# Patient Record
Sex: FEMALE | Race: WHITE | Employment: OTHER
[De-identification: names, ages, dates, MRNs, and addresses within clinical notes are randomized per-mention and may not be internally consistent; named-entity substitution may affect disease eponyms.]

---

## 2017-01-11 ENCOUNTER — PRIOR ORIGINAL RECORDS (OUTPATIENT)
Dept: OTHER | Age: 77
End: 2017-01-11

## 2017-02-21 PROBLEM — J43.2 CENTRILOBULAR EMPHYSEMA (HCC): Status: ACTIVE | Noted: 2017-02-21

## 2017-03-07 ENCOUNTER — CARDPULM VISIT (OUTPATIENT)
Dept: CARDIAC REHAB | Facility: HOSPITAL | Age: 77
End: 2017-03-07
Attending: INTERNAL MEDICINE
Payer: MEDICARE

## 2017-03-07 VITALS
OXYGEN SATURATION: 95 % | DIASTOLIC BLOOD PRESSURE: 80 MMHG | HEIGHT: 59 IN | HEART RATE: 81 BPM | RESPIRATION RATE: 12 BRPM | BODY MASS INDEX: 28.76 KG/M2 | WEIGHT: 142.63 LBS | SYSTOLIC BLOOD PRESSURE: 126 MMHG

## 2017-03-07 DIAGNOSIS — J43.2 CENTRIACINAR EMPHYSEMA (HCC): Primary | ICD-10-CM

## 2017-03-20 ENCOUNTER — CARDPULM VISIT (OUTPATIENT)
Dept: CARDIAC REHAB | Facility: HOSPITAL | Age: 77
End: 2017-03-20
Attending: INTERNAL MEDICINE
Payer: MEDICARE

## 2017-03-22 ENCOUNTER — CARDPULM VISIT (OUTPATIENT)
Dept: CARDIAC REHAB | Facility: HOSPITAL | Age: 77
End: 2017-03-22
Attending: INTERNAL MEDICINE
Payer: MEDICARE

## 2017-03-24 ENCOUNTER — CARDPULM VISIT (OUTPATIENT)
Dept: CARDIAC REHAB | Facility: HOSPITAL | Age: 77
End: 2017-03-24
Attending: INTERNAL MEDICINE
Payer: MEDICARE

## 2017-03-27 ENCOUNTER — CARDPULM VISIT (OUTPATIENT)
Dept: CARDIAC REHAB | Facility: HOSPITAL | Age: 77
End: 2017-03-27
Attending: INTERNAL MEDICINE
Payer: MEDICARE

## 2017-03-29 ENCOUNTER — CARDPULM VISIT (OUTPATIENT)
Dept: CARDIAC REHAB | Facility: HOSPITAL | Age: 77
End: 2017-03-29
Attending: INTERNAL MEDICINE
Payer: MEDICARE

## 2017-04-03 ENCOUNTER — CARDPULM VISIT (OUTPATIENT)
Dept: CARDIAC REHAB | Facility: HOSPITAL | Age: 77
End: 2017-04-03
Attending: INTERNAL MEDICINE
Payer: MEDICARE

## 2017-04-05 ENCOUNTER — CARDPULM VISIT (OUTPATIENT)
Dept: CARDIAC REHAB | Facility: HOSPITAL | Age: 77
End: 2017-04-05
Attending: INTERNAL MEDICINE
Payer: MEDICARE

## 2017-04-07 ENCOUNTER — APPOINTMENT (OUTPATIENT)
Dept: CARDIAC REHAB | Facility: HOSPITAL | Age: 77
End: 2017-04-07
Attending: INTERNAL MEDICINE
Payer: MEDICARE

## 2017-04-12 ENCOUNTER — APPOINTMENT (OUTPATIENT)
Dept: CARDIAC REHAB | Facility: HOSPITAL | Age: 77
End: 2017-04-12
Attending: INTERNAL MEDICINE
Payer: MEDICARE

## 2017-04-14 ENCOUNTER — APPOINTMENT (OUTPATIENT)
Dept: CARDIAC REHAB | Facility: HOSPITAL | Age: 77
End: 2017-04-14
Attending: INTERNAL MEDICINE
Payer: MEDICARE

## 2017-04-17 ENCOUNTER — CARDPULM VISIT (OUTPATIENT)
Dept: CARDIAC REHAB | Facility: HOSPITAL | Age: 77
End: 2017-04-17
Attending: INTERNAL MEDICINE
Payer: MEDICARE

## 2017-04-19 PROBLEM — R00.2 PALPITATIONS: Status: ACTIVE | Noted: 2017-04-19

## 2017-04-20 ENCOUNTER — HOSPITAL ENCOUNTER (OUTPATIENT)
Dept: CV DIAGNOSTICS | Facility: HOSPITAL | Age: 77
Discharge: HOME | End: 2017-04-20
Attending: INTERNAL MEDICINE
Payer: MEDICARE

## 2017-04-20 DIAGNOSIS — R00.2 PALPITATIONS: ICD-10-CM

## 2017-04-20 DIAGNOSIS — K82.9 GALL BLADDER DISEASE: ICD-10-CM

## 2017-04-20 PROCEDURE — 93225 XTRNL ECG REC<48 HRS REC: CPT

## 2017-04-20 PROCEDURE — 93227 XTRNL ECG REC<48 HR R&I: CPT | Performed by: INTERNAL MEDICINE

## 2017-04-20 PROCEDURE — 93226 XTRNL ECG REC<48 HR SCAN A/R: CPT

## 2017-04-21 ENCOUNTER — CARDPULM VISIT (OUTPATIENT)
Dept: CARDIAC REHAB | Facility: HOSPITAL | Age: 77
End: 2017-04-21
Attending: INTERNAL MEDICINE
Payer: MEDICARE

## 2017-04-24 ENCOUNTER — APPOINTMENT (OUTPATIENT)
Dept: CARDIAC REHAB | Facility: HOSPITAL | Age: 77
End: 2017-04-24
Attending: INTERNAL MEDICINE
Payer: MEDICARE

## 2017-04-26 ENCOUNTER — CARDPULM VISIT (OUTPATIENT)
Dept: CARDIAC REHAB | Facility: HOSPITAL | Age: 77
End: 2017-04-26
Attending: INTERNAL MEDICINE
Payer: MEDICARE

## 2017-04-28 ENCOUNTER — CARDPULM VISIT (OUTPATIENT)
Dept: CARDIAC REHAB | Facility: HOSPITAL | Age: 77
End: 2017-04-28
Attending: INTERNAL MEDICINE
Payer: MEDICARE

## 2017-05-08 ENCOUNTER — CARDPULM VISIT (OUTPATIENT)
Dept: CARDIAC REHAB | Facility: HOSPITAL | Age: 77
End: 2017-05-08
Attending: INTERNAL MEDICINE
Payer: MEDICARE

## 2017-05-10 ENCOUNTER — CARDPULM VISIT (OUTPATIENT)
Dept: CARDIAC REHAB | Facility: HOSPITAL | Age: 77
End: 2017-05-10
Attending: INTERNAL MEDICINE
Payer: MEDICARE

## 2017-05-12 ENCOUNTER — CARDPULM VISIT (OUTPATIENT)
Dept: CARDIAC REHAB | Facility: HOSPITAL | Age: 77
End: 2017-05-12
Attending: INTERNAL MEDICINE
Payer: MEDICARE

## 2017-05-15 ENCOUNTER — APPOINTMENT (OUTPATIENT)
Dept: CARDIAC REHAB | Facility: HOSPITAL | Age: 77
End: 2017-05-15
Attending: INTERNAL MEDICINE
Payer: MEDICARE

## 2017-05-26 ENCOUNTER — APPOINTMENT (OUTPATIENT)
Dept: GENERAL RADIOLOGY | Facility: HOSPITAL | Age: 77
End: 2017-05-26
Attending: EMERGENCY MEDICINE
Payer: MEDICARE

## 2017-05-26 ENCOUNTER — HOSPITAL ENCOUNTER (EMERGENCY)
Facility: HOSPITAL | Age: 77
Discharge: HOME | End: 2017-05-26
Attending: EMERGENCY MEDICINE
Payer: MEDICARE

## 2017-05-26 VITALS
HEART RATE: 68 BPM | OXYGEN SATURATION: 97 % | DIASTOLIC BLOOD PRESSURE: 60 MMHG | SYSTOLIC BLOOD PRESSURE: 114 MMHG | RESPIRATION RATE: 15 BRPM | TEMPERATURE: 98 F

## 2017-05-26 DIAGNOSIS — S83.92XA SPRAIN OF LEFT KNEE, UNSPECIFIED LIGAMENT, INITIAL ENCOUNTER: Primary | ICD-10-CM

## 2017-05-26 PROCEDURE — 99283 EMERGENCY DEPT VISIT LOW MDM: CPT

## 2017-05-26 PROCEDURE — 73560 X-RAY EXAM OF KNEE 1 OR 2: CPT | Performed by: EMERGENCY MEDICINE

## 2017-05-27 NOTE — ED PROVIDER NOTES
Patient Seen in: [facility] Emergency Department    History   Patient presents with:  Lower Extremity Injury (musculoskeletal)    Stated Complaint: L KNEE PAIN    HPI    68-year-old female who presents her to the emergency department complaining of Take 1 tablet by mouth. Acetaminophen (TYLENOL OR),     Fluticasone Propionate (FLONASE) 50 MCG/ACT Nasal Suspension,  by Each Nare route daily. Multiple Vitamin (MULTI-VITAMIN) Oral Tab,  Take 1 tablet by mouth daily.    SB LOW DOSE ASA EC 81 MG OR TBE obvious deformity. No ligamentous laxity on examination knee but there is pain along the medial collateral ligament. No obvious effusions. No calf tenderness.        ED Course   Labs Reviewed - No data to display        XR KNEE (1 OR 2 VIEWS), LEFT (CPT=

## 2017-05-27 NOTE — ED INITIAL ASSESSMENT (HPI)
Pt to ED from home with c/o left knee pain since she twisted her knee yesterday while walking dog. Pt ambulatory, c/o pain with bending.

## 2017-05-29 ENCOUNTER — HOSPITAL ENCOUNTER (EMERGENCY)
Facility: HOSPITAL | Age: 77
Discharge: HOME | End: 2017-05-29
Attending: EMERGENCY MEDICINE
Payer: MEDICARE

## 2017-05-29 ENCOUNTER — APPOINTMENT (OUTPATIENT)
Dept: GENERAL RADIOLOGY | Facility: HOSPITAL | Age: 77
End: 2017-05-29
Attending: EMERGENCY MEDICINE
Payer: MEDICARE

## 2017-05-29 VITALS
HEART RATE: 74 BPM | TEMPERATURE: 98 F | SYSTOLIC BLOOD PRESSURE: 128 MMHG | BODY MASS INDEX: 28.22 KG/M2 | HEIGHT: 59 IN | RESPIRATION RATE: 16 BRPM | DIASTOLIC BLOOD PRESSURE: 66 MMHG | WEIGHT: 140 LBS | OXYGEN SATURATION: 98 %

## 2017-05-29 DIAGNOSIS — S61.511A: ICD-10-CM

## 2017-05-29 DIAGNOSIS — S63.501A WRIST SPRAIN, RIGHT, INITIAL ENCOUNTER: Primary | ICD-10-CM

## 2017-05-29 PROCEDURE — 73110 X-RAY EXAM OF WRIST: CPT | Performed by: EMERGENCY MEDICINE

## 2017-05-29 PROCEDURE — 99283 EMERGENCY DEPT VISIT LOW MDM: CPT

## 2017-05-29 PROCEDURE — 90471 IMMUNIZATION ADMIN: CPT

## 2017-05-29 PROCEDURE — 12001 RPR S/N/AX/GEN/TRNK 2.5CM/<: CPT

## 2017-05-29 RX ORDER — TETANUS AND DIPHTHERIA TOXOIDS ADSORBED 2; 2 [LF]/.5ML; [LF]/.5ML
0.5 INJECTION INTRAMUSCULAR ONCE
Status: COMPLETED | OUTPATIENT
Start: 2017-05-29 | End: 2017-05-29

## 2017-05-30 NOTE — ED PROVIDER NOTES
Patient Seen in: [facility] Emergency Department    History   Patient presents with:  Laceration Abrasion (integumentary)  Upper Extremity Injury (musculoskeletal)    Stated Complaint: abrasion to right forearm    HPI    68-year-old female presented hydrocortisone 2.5 % External Cream,  Use on AA BID prn   Econazole Nitrate 1 % External Cream,  Use on AA BID prn   Probiotic Product (ALIGN OR),  Take 1 tablet by mouth.    Acetaminophen (TYLENOL OR),     Fluticasone Propionate (FLONASE) 50 MCG/ACT Nasal Current:/66 mmHg  Pulse 75  Temp(Src) 97.8 °F (36.6 °C) (Temporal)  Resp 16  Ht 149.9 cm (4' 11\")  Wt 63.504 kg  BMI 28.26 kg/m2  SpO2 95%        Physical Exam   Constitutional: She is oriented to person, place, and time.  She appears well-developed Medications Prescribed:  Current Discharge Medication List

## 2017-05-30 NOTE — ED NOTES
RE-EVAL PER MD AND OK FOR DC HOME WITH FU INST TO PMD IN 1-2 DAYS AS DIRECTED. REVIEW OF RADIOGRAPHICS. QUESTIONS ANSWERED PER CECY GUTIERREZ.  IN AGREEMENT WITH POC.   AMB + GAIT WITH GRANDDAUGHTER

## 2017-05-31 ENCOUNTER — APPOINTMENT (OUTPATIENT)
Dept: CARDIAC REHAB | Facility: HOSPITAL | Age: 77
End: 2017-05-31
Attending: INTERNAL MEDICINE
Payer: MEDICARE

## 2017-06-02 ENCOUNTER — APPOINTMENT (OUTPATIENT)
Dept: CARDIAC REHAB | Facility: HOSPITAL | Age: 77
End: 2017-06-02
Attending: INTERNAL MEDICINE
Payer: MEDICARE

## 2017-06-02 PROBLEM — S60.811A: Status: ACTIVE | Noted: 2017-06-02

## 2017-06-16 PROBLEM — S63.501D: Status: ACTIVE | Noted: 2017-06-16

## 2017-06-16 PROBLEM — S60.811D: Status: ACTIVE | Noted: 2017-06-16

## 2017-08-21 PROBLEM — Z87.891 HISTORY OF TOBACCO ABUSE: Status: ACTIVE | Noted: 2017-08-21

## 2017-10-26 PROBLEM — S60.811A: Status: RESOLVED | Noted: 2017-06-02 | Resolved: 2017-10-26

## 2017-10-26 PROBLEM — H26.9 CATARACT OF BOTH EYES, UNSPECIFIED CATARACT TYPE: Status: ACTIVE | Noted: 2017-10-26

## 2017-10-26 PROBLEM — M47.816 LUMBAR ARTHROPATHY: Status: ACTIVE | Noted: 2017-10-26

## 2017-10-26 PROBLEM — R00.2 PALPITATIONS: Status: RESOLVED | Noted: 2017-04-19 | Resolved: 2017-10-26

## 2017-10-26 PROBLEM — S63.501D: Status: RESOLVED | Noted: 2017-06-16 | Resolved: 2017-10-26

## 2017-10-26 PROBLEM — S60.811D: Status: RESOLVED | Noted: 2017-06-16 | Resolved: 2017-10-26

## 2017-11-20 ENCOUNTER — HOSPITAL (OUTPATIENT)
Dept: OTHER | Age: 77
End: 2017-11-20
Attending: INTERNAL MEDICINE

## 2018-02-21 ENCOUNTER — PRIOR ORIGINAL RECORDS (OUTPATIENT)
Dept: OTHER | Age: 78
End: 2018-02-21

## 2018-04-18 ENCOUNTER — OFFICE VISIT (OUTPATIENT)
Dept: HEMATOLOGY/ONCOLOGY | Facility: HOSPITAL | Age: 78
End: 2018-04-18
Attending: OBSTETRICS & GYNECOLOGY
Payer: MEDICARE

## 2018-04-18 VITALS
SYSTOLIC BLOOD PRESSURE: 149 MMHG | TEMPERATURE: 98 F | DIASTOLIC BLOOD PRESSURE: 71 MMHG | OXYGEN SATURATION: 100 % | HEIGHT: 60 IN | WEIGHT: 141 LBS | RESPIRATION RATE: 18 BRPM | HEART RATE: 95 BPM | BODY MASS INDEX: 27.68 KG/M2

## 2018-04-18 PROCEDURE — 99211 OFF/OP EST MAY X REQ PHY/QHP: CPT

## 2018-04-18 NOTE — PROGRESS NOTES
Patient is here for MD consult. Hx of anal cancer. Pt had chemo/XRT in 2007 for tx of anal cancer. Saw Dermatologist recently and was found to have a lesion on vulvar. Here to discuss.        Education Record    Learner:  Patient    Disease / Diagnosis: con

## 2018-04-19 NOTE — CONSULTS
INITIAL GYNECOLOGY ONCOLOGY EVALUATION      Patient: Camryn Mohr  MR#: [id number]  Date: 4/19/2018    Referring Physician:  Melissa Anchors:  [id number]    HISTORY OF PRESENT ILLNESS:    Camryn Mohr is a 78year old female who presents today Inhalation Aero Soln Inhale 2 puffs into the lungs every 6 (six) hours as needed for Wheezing. Disp: 1 Inhaler Rfl: 3   Probiotic Product (ALIGN OR) Take 1 tablet by mouth.  Disp:  Rfl:    Acetaminophen (TYLENOL OR)  Disp:  Rfl:    Multiple Vitamin (MULTI-V Clear  HEART: Normal rhythm, no extra sounds  ABDOMEN: No masses or tendeness  EXTREMITIES: No edema  NEUROLOGIC: Negative  Pelvic: no visible lesions on the vulva especially at the 5:00 position    DATABASE:  Path reviewed     ASSESSMENT:  Tere Bullard i

## 2018-05-09 PROBLEM — D07.1 VIN III (VULVAR INTRAEPITHELIAL NEOPLASIA III): Status: ACTIVE | Noted: 2018-05-09

## 2018-05-09 PROBLEM — M47.816 LUMBAR ARTHROPATHY: Status: RESOLVED | Noted: 2017-10-26 | Resolved: 2018-05-09

## 2018-05-09 PROBLEM — J43.2 CENTRILOBULAR EMPHYSEMA (HCC): Chronic | Status: ACTIVE | Noted: 2017-02-21

## 2018-05-09 PROBLEM — D07.1 VIN III (VULVAR INTRAEPITHELIAL NEOPLASIA III): Chronic | Status: ACTIVE | Noted: 2018-05-09

## 2018-08-07 ENCOUNTER — HOSPITAL (OUTPATIENT)
Dept: OTHER | Age: 78
End: 2018-08-07
Attending: ORTHOPAEDIC SURGERY

## 2018-08-15 ENCOUNTER — APPOINTMENT (OUTPATIENT)
Dept: HEMATOLOGY/ONCOLOGY | Facility: HOSPITAL | Age: 78
End: 2018-08-15
Attending: OBSTETRICS & GYNECOLOGY
Payer: MEDICARE

## 2018-08-20 ENCOUNTER — LAB ENCOUNTER (OUTPATIENT)
Dept: LAB | Facility: HOSPITAL | Age: 78
End: 2018-08-20
Attending: INTERNAL MEDICINE
Payer: MEDICARE

## 2018-08-20 DIAGNOSIS — R00.2 PALPITATIONS: ICD-10-CM

## 2018-08-20 DIAGNOSIS — Z01.818 PREOPERATIVE EVALUATION TO RULE OUT SURGICAL CONTRAINDICATION: ICD-10-CM

## 2018-08-20 PROBLEM — M76.821 POSTERIOR TIBIAL TENDON DYSFUNCTION (PTTD) OF RIGHT LOWER EXTREMITY: Status: ACTIVE | Noted: 2018-08-20

## 2018-08-20 LAB
CHOLEST SERPL-MCNC: 260 MG/DL (ref 110–200)
HDLC SERPL-MCNC: 69 MG/DL
LDLC SERPL CALC-MCNC: 150 MG/DL (ref 0–99)
NONHDLC SERPL-MCNC: 191 MG/DL
TRIGL SERPL-MCNC: 205 MG/DL (ref 1–149)

## 2018-08-20 PROCEDURE — 36415 COLL VENOUS BLD VENIPUNCTURE: CPT

## 2018-08-20 PROCEDURE — 80061 LIPID PANEL: CPT

## 2018-08-29 ENCOUNTER — OFFICE VISIT (OUTPATIENT)
Dept: HEMATOLOGY/ONCOLOGY | Facility: HOSPITAL | Age: 78
End: 2018-08-29
Attending: OBSTETRICS & GYNECOLOGY
Payer: MEDICARE

## 2018-08-29 VITALS
RESPIRATION RATE: 18 BRPM | DIASTOLIC BLOOD PRESSURE: 81 MMHG | HEIGHT: 60 IN | TEMPERATURE: 97 F | HEART RATE: 96 BPM | BODY MASS INDEX: 27.64 KG/M2 | SYSTOLIC BLOOD PRESSURE: 130 MMHG | WEIGHT: 140.81 LBS

## 2018-08-29 PROCEDURE — 99211 OFF/OP EST MAY X REQ PHY/QHP: CPT

## 2018-08-29 NOTE — PROGRESS NOTES
Yasmine Irizarry is a 70year old female who presents today with anal cell carcinoma 2007. As part of a derm screen left vulva pigmented area VIN3 with involved margin,  bx of right lesion is a condyloma. Here for consultation 4/18.   Lesion at 5:00 totally r

## 2018-08-29 NOTE — PROGRESS NOTES
Patient is here for Dr Mata Breaux f/u for anal cancer. Patient denies any complaints. No pain.        Education Record    Learner:  Patient    Disease / Diagnosis: anal cancer     Barriers / Limitations:  None   Comments:    Method:  Discussion   Comments:    GRACE

## 2018-09-11 ENCOUNTER — ANESTHESIA (OUTPATIENT)
Dept: SURGERY | Facility: HOSPITAL | Age: 78
End: 2018-09-11
Payer: MEDICARE

## 2018-09-11 ENCOUNTER — APPOINTMENT (OUTPATIENT)
Dept: GENERAL RADIOLOGY | Facility: HOSPITAL | Age: 78
End: 2018-09-11
Attending: ORTHOPAEDIC SURGERY
Payer: MEDICARE

## 2018-09-11 ENCOUNTER — HOSPITAL ENCOUNTER (OUTPATIENT)
Facility: HOSPITAL | Age: 78
Setting detail: OBSERVATION
Discharge: HOME HEALTH | End: 2018-09-12
Attending: ORTHOPAEDIC SURGERY | Admitting: ORTHOPAEDIC SURGERY
Payer: MEDICARE

## 2018-09-11 ENCOUNTER — ANESTHESIA EVENT (OUTPATIENT)
Dept: SURGERY | Facility: HOSPITAL | Age: 78
End: 2018-09-11
Payer: MEDICARE

## 2018-09-11 DIAGNOSIS — M76.821 POSTERIOR TIBIAL TENDON DYSFUNCTION (PTTD) OF RIGHT LOWER EXTREMITY: ICD-10-CM

## 2018-09-11 DIAGNOSIS — M21.6X1: ICD-10-CM

## 2018-09-11 LAB
ADENOVIRUS F 40/41 PCR: NEGATIVE
ASTROVIRUS PCR: NEGATIVE
C CAYETANENSIS DNA SPEC QL NAA+PROBE: NEGATIVE
C. DIFFICILE TOXIN A/B PCR: NEGATIVE
CAMPY SP DNA.DIARRHEA STL QL NAA+PROBE: NEGATIVE
CRYPTOSP DNA SPEC QL NAA+PROBE: NEGATIVE
EAEC PAA PLAS AGGR+AATA ST NAA+NON-PRB: NEGATIVE
EC STX1+STX2 + H7 FLIC SPEC NAA+PROBE: NEGATIVE
ENTAMOEBA HISTOLYTICA PCR: NEGATIVE
EPEC EAE GENE STL QL NAA+NON-PROBE: NEGATIVE
ETEC LTA+ST1A+ST1B TOX ST NAA+NON-PROBE: NEGATIVE
GIARDIA LAMBLIA PCR: NEGATIVE
NOROVIRUS GI/GII PCR: NEGATIVE
P SHIGELLOIDES DNA STL QL NAA+PROBE: NEGATIVE
ROTAVIRUS A PCR: NEGATIVE
SALMONELLA DNA SPEC QL NAA+PROBE: NEGATIVE
SAPOVIRUS PCR: NEGATIVE
SHIGELLA SP+EIEC IPAH ST NAA+NON-PROBE: NEGATIVE
V CHOLERAE DNA SPEC QL NAA+PROBE: NEGATIVE
VIBRIO DNA SPEC NAA+PROBE: NEGATIVE
YERSINIA DNA SPEC NAA+PROBE: NEGATIVE

## 2018-09-11 PROCEDURE — 73650 X-RAY EXAM OF HEEL: CPT | Performed by: ORTHOPAEDIC SURGERY

## 2018-09-11 PROCEDURE — 3E0T3BZ INTRODUCTION OF ANESTHETIC AGENT INTO PERIPHERAL NERVES AND PLEXI, PERCUTANEOUS APPROACH: ICD-10-PCS | Performed by: ANESTHESIOLOGY

## 2018-09-11 PROCEDURE — 64445 NJX AA&/STRD SCIATIC NRV IMG: CPT | Performed by: ORTHOPAEDIC SURGERY

## 2018-09-11 PROCEDURE — 87507 IADNA-DNA/RNA PROBE TQ 12-25: CPT | Performed by: ORTHOPAEDIC SURGERY

## 2018-09-11 PROCEDURE — 76942 ECHO GUIDE FOR BIOPSY: CPT | Performed by: ORTHOPAEDIC SURGERY

## 2018-09-11 PROCEDURE — 0MQQ0ZZ REPAIR RIGHT ANKLE BURSA AND LIGAMENT, OPEN APPROACH: ICD-10-PCS | Performed by: ORTHOPAEDIC SURGERY

## 2018-09-11 PROCEDURE — 0LSN0ZZ REPOSITION RIGHT LOWER LEG TENDON, OPEN APPROACH: ICD-10-PCS | Performed by: ORTHOPAEDIC SURGERY

## 2018-09-11 PROCEDURE — 64447 NJX AA&/STRD FEMORAL NRV IMG: CPT | Performed by: ORTHOPAEDIC SURGERY

## 2018-09-11 PROCEDURE — 0LXN0ZZ TRANSFER RIGHT LOWER LEG TENDON, OPEN APPROACH: ICD-10-PCS | Performed by: ORTHOPAEDIC SURGERY

## 2018-09-11 PROCEDURE — 76000 FLUOROSCOPY <1 HR PHYS/QHP: CPT | Performed by: ORTHOPAEDIC SURGERY

## 2018-09-11 PROCEDURE — 0QSL04Z REPOSITION RIGHT TARSAL WITH INTERNAL FIXATION DEVICE, OPEN APPROACH: ICD-10-PCS | Performed by: ORTHOPAEDIC SURGERY

## 2018-09-11 PROCEDURE — 99152 MOD SED SAME PHYS/QHP 5/>YRS: CPT | Performed by: ORTHOPAEDIC SURGERY

## 2018-09-11 DEVICE — SCREW BN 4MM 6MM 46MM LCP SS: Type: IMPLANTABLE DEVICE | Status: FUNCTIONAL

## 2018-09-11 DEVICE — ANCHOR SUT 4.75MM: Type: IMPLANTABLE DEVICE | Status: FUNCTIONAL

## 2018-09-11 DEVICE — ANCHOR SWIVELOCK AR-2324BCC: Type: IMPLANTABLE DEVICE | Status: FUNCTIONAL

## 2018-09-11 RX ORDER — SODIUM CHLORIDE, SODIUM LACTATE, POTASSIUM CHLORIDE, CALCIUM CHLORIDE 600; 310; 30; 20 MG/100ML; MG/100ML; MG/100ML; MG/100ML
INJECTION, SOLUTION INTRAVENOUS CONTINUOUS
Status: DISCONTINUED | OUTPATIENT
Start: 2018-09-11 | End: 2018-09-12

## 2018-09-11 RX ORDER — LIDOCAINE HYDROCHLORIDE 10 MG/ML
INJECTION, SOLUTION INFILTRATION; PERINEURAL
Status: DISCONTINUED | OUTPATIENT
Start: 2018-09-11 | End: 2018-09-11

## 2018-09-11 RX ORDER — ONDANSETRON 2 MG/ML
INJECTION INTRAMUSCULAR; INTRAVENOUS AS NEEDED
Status: DISCONTINUED | OUTPATIENT
Start: 2018-09-11 | End: 2018-09-11 | Stop reason: SURG

## 2018-09-11 RX ORDER — ROPIVACAINE HYDROCHLORIDE 5 MG/ML
INJECTION, SOLUTION EPIDURAL; INFILTRATION; PERINEURAL
Status: COMPLETED | OUTPATIENT
Start: 2018-09-11 | End: 2018-09-11

## 2018-09-11 RX ORDER — DEXAMETHASONE SODIUM PHOSPHATE 10 MG/ML
INJECTION, SOLUTION INTRAMUSCULAR; INTRAVENOUS
Status: DISCONTINUED | OUTPATIENT
Start: 2018-09-11 | End: 2018-09-11

## 2018-09-11 RX ORDER — METOCLOPRAMIDE 10 MG/1
10 TABLET ORAL ONCE
Status: DISCONTINUED | OUTPATIENT
Start: 2018-09-11 | End: 2018-09-11 | Stop reason: HOSPADM

## 2018-09-11 RX ORDER — BISACODYL 10 MG
10 SUPPOSITORY, RECTAL RECTAL
Status: DISCONTINUED | OUTPATIENT
Start: 2018-09-11 | End: 2018-09-12

## 2018-09-11 RX ORDER — NALOXONE HYDROCHLORIDE 0.4 MG/ML
80 INJECTION, SOLUTION INTRAMUSCULAR; INTRAVENOUS; SUBCUTANEOUS AS NEEDED
Status: DISCONTINUED | OUTPATIENT
Start: 2018-09-11 | End: 2018-09-11 | Stop reason: HOSPADM

## 2018-09-11 RX ORDER — LIDOCAINE HYDROCHLORIDE 10 MG/ML
INJECTION, SOLUTION EPIDURAL; INFILTRATION; INTRACAUDAL; PERINEURAL AS NEEDED
Status: DISCONTINUED | OUTPATIENT
Start: 2018-09-11 | End: 2018-09-11 | Stop reason: SURG

## 2018-09-11 RX ORDER — SODIUM CHLORIDE 0.9 % (FLUSH) 0.9 %
10 SYRINGE (ML) INJECTION AS NEEDED
Status: DISCONTINUED | OUTPATIENT
Start: 2018-09-11 | End: 2018-09-12

## 2018-09-11 RX ORDER — METOCLOPRAMIDE HYDROCHLORIDE 5 MG/ML
10 INJECTION INTRAMUSCULAR; INTRAVENOUS EVERY 6 HOURS PRN
Status: DISCONTINUED | OUTPATIENT
Start: 2018-09-11 | End: 2018-09-12

## 2018-09-11 RX ORDER — ONDANSETRON 2 MG/ML
4 INJECTION INTRAMUSCULAR; INTRAVENOUS EVERY 6 HOURS PRN
Status: DISCONTINUED | OUTPATIENT
Start: 2018-09-11 | End: 2018-09-12

## 2018-09-11 RX ORDER — ENOXAPARIN SODIUM 100 MG/ML
40 INJECTION SUBCUTANEOUS DAILY
Status: DISCONTINUED | OUTPATIENT
Start: 2018-09-12 | End: 2018-09-12

## 2018-09-11 RX ORDER — ALBUTEROL SULFATE 90 UG/1
2 AEROSOL, METERED RESPIRATORY (INHALATION) EVERY 6 HOURS PRN
Status: DISCONTINUED | OUTPATIENT
Start: 2018-09-11 | End: 2018-09-12

## 2018-09-11 RX ORDER — ONDANSETRON 2 MG/ML
4 INJECTION INTRAMUSCULAR; INTRAVENOUS ONCE AS NEEDED
Status: DISCONTINUED | OUTPATIENT
Start: 2018-09-11 | End: 2018-09-11 | Stop reason: HOSPADM

## 2018-09-11 RX ORDER — MORPHINE SULFATE 10 MG/ML
6 INJECTION, SOLUTION INTRAMUSCULAR; INTRAVENOUS EVERY 10 MIN PRN
Status: DISCONTINUED | OUTPATIENT
Start: 2018-09-11 | End: 2018-09-11 | Stop reason: HOSPADM

## 2018-09-11 RX ORDER — MORPHINE SULFATE 2 MG/ML
2 INJECTION, SOLUTION INTRAMUSCULAR; INTRAVENOUS EVERY 2 HOUR PRN
Status: DISCONTINUED | OUTPATIENT
Start: 2018-09-11 | End: 2018-09-12

## 2018-09-11 RX ORDER — MORPHINE SULFATE 4 MG/ML
4 INJECTION, SOLUTION INTRAMUSCULAR; INTRAVENOUS EVERY 2 HOUR PRN
Status: DISCONTINUED | OUTPATIENT
Start: 2018-09-11 | End: 2018-09-12

## 2018-09-11 RX ORDER — CEFAZOLIN SODIUM/WATER 2 G/20 ML
SYRINGE (ML) INTRAVENOUS AS NEEDED
Status: DISCONTINUED | OUTPATIENT
Start: 2018-09-11 | End: 2018-09-11 | Stop reason: SURG

## 2018-09-11 RX ORDER — DEXAMETHASONE SODIUM PHOSPHATE 4 MG/ML
VIAL (ML) INJECTION AS NEEDED
Status: DISCONTINUED | OUTPATIENT
Start: 2018-09-11 | End: 2018-09-11 | Stop reason: SURG

## 2018-09-11 RX ORDER — POLYETHYLENE GLYCOL 3350 17 G/17G
17 POWDER, FOR SOLUTION ORAL DAILY PRN
Status: DISCONTINUED | OUTPATIENT
Start: 2018-09-11 | End: 2018-09-12

## 2018-09-11 RX ORDER — FAMOTIDINE 20 MG/1
20 TABLET ORAL ONCE
Status: DISCONTINUED | OUTPATIENT
Start: 2018-09-11 | End: 2018-09-11 | Stop reason: HOSPADM

## 2018-09-11 RX ORDER — MORPHINE SULFATE 4 MG/ML
2 INJECTION, SOLUTION INTRAMUSCULAR; INTRAVENOUS EVERY 10 MIN PRN
Status: DISCONTINUED | OUTPATIENT
Start: 2018-09-11 | End: 2018-09-11 | Stop reason: HOSPADM

## 2018-09-11 RX ORDER — SODIUM PHOSPHATE, DIBASIC AND SODIUM PHOSPHATE, MONOBASIC 7; 19 G/133ML; G/133ML
1 ENEMA RECTAL ONCE AS NEEDED
Status: DISCONTINUED | OUTPATIENT
Start: 2018-09-11 | End: 2018-09-12

## 2018-09-11 RX ORDER — HYDROCODONE BITARTRATE AND ACETAMINOPHEN 10; 325 MG/1; MG/1
1 TABLET ORAL EVERY 4 HOURS PRN
Status: DISCONTINUED | OUTPATIENT
Start: 2018-09-11 | End: 2018-09-12

## 2018-09-11 RX ORDER — LIDOCAINE HYDROCHLORIDE 10 MG/ML
INJECTION, SOLUTION INFILTRATION; PERINEURAL
Status: COMPLETED | OUTPATIENT
Start: 2018-09-11 | End: 2018-09-11

## 2018-09-11 RX ORDER — MORPHINE SULFATE 2 MG/ML
1 INJECTION, SOLUTION INTRAMUSCULAR; INTRAVENOUS EVERY 2 HOUR PRN
Status: DISCONTINUED | OUTPATIENT
Start: 2018-09-11 | End: 2018-09-12

## 2018-09-11 RX ORDER — SODIUM CHLORIDE, SODIUM LACTATE, POTASSIUM CHLORIDE, CALCIUM CHLORIDE 600; 310; 30; 20 MG/100ML; MG/100ML; MG/100ML; MG/100ML
INJECTION, SOLUTION INTRAVENOUS CONTINUOUS
Status: DISCONTINUED | OUTPATIENT
Start: 2018-09-11 | End: 2018-09-11 | Stop reason: HOSPADM

## 2018-09-11 RX ORDER — CEFAZOLIN SODIUM/WATER 2 G/20 ML
2 SYRINGE (ML) INTRAVENOUS ONCE
Status: DISCONTINUED | OUTPATIENT
Start: 2018-09-11 | End: 2018-09-11 | Stop reason: HOSPADM

## 2018-09-11 RX ORDER — DOCUSATE SODIUM 100 MG/1
100 CAPSULE, LIQUID FILLED ORAL 2 TIMES DAILY
Status: DISCONTINUED | OUTPATIENT
Start: 2018-09-11 | End: 2018-09-12

## 2018-09-11 RX ORDER — MORPHINE SULFATE 4 MG/ML
4 INJECTION, SOLUTION INTRAMUSCULAR; INTRAVENOUS EVERY 10 MIN PRN
Status: DISCONTINUED | OUTPATIENT
Start: 2018-09-11 | End: 2018-09-11 | Stop reason: HOSPADM

## 2018-09-11 RX ORDER — HYDROCODONE BITARTRATE AND ACETAMINOPHEN 5; 325 MG/1; MG/1
1 TABLET ORAL AS NEEDED
Status: DISCONTINUED | OUTPATIENT
Start: 2018-09-11 | End: 2018-09-11 | Stop reason: HOSPADM

## 2018-09-11 RX ORDER — CEFAZOLIN SODIUM/WATER 2 G/20 ML
2 SYRINGE (ML) INTRAVENOUS EVERY 8 HOURS
Status: COMPLETED | OUTPATIENT
Start: 2018-09-11 | End: 2018-09-12

## 2018-09-11 RX ORDER — ACETAMINOPHEN 500 MG
1000 TABLET ORAL ONCE
Status: COMPLETED | OUTPATIENT
Start: 2018-09-11 | End: 2018-09-11

## 2018-09-11 RX ORDER — HYDROCODONE BITARTRATE AND ACETAMINOPHEN 5; 325 MG/1; MG/1
2 TABLET ORAL AS NEEDED
Status: DISCONTINUED | OUTPATIENT
Start: 2018-09-11 | End: 2018-09-11 | Stop reason: HOSPADM

## 2018-09-11 RX ORDER — ROPIVACAINE HYDROCHLORIDE 5 MG/ML
INJECTION, SOLUTION EPIDURAL; INFILTRATION; PERINEURAL
Status: DISCONTINUED | OUTPATIENT
Start: 2018-09-11 | End: 2018-09-11

## 2018-09-11 RX ORDER — DEXAMETHASONE SODIUM PHOSPHATE 10 MG/ML
INJECTION, SOLUTION INTRAMUSCULAR; INTRAVENOUS
Status: COMPLETED | OUTPATIENT
Start: 2018-09-11 | End: 2018-09-11

## 2018-09-11 RX ADMIN — DEXAMETHASONE SODIUM PHOSPHATE 4 MG: 4 MG/ML VIAL (ML) INJECTION at 10:49:00

## 2018-09-11 RX ADMIN — LIDOCAINE HYDROCHLORIDE 20 MG: 10 INJECTION, SOLUTION EPIDURAL; INFILTRATION; INTRACAUDAL; PERINEURAL at 10:45:00

## 2018-09-11 RX ADMIN — SODIUM CHLORIDE, SODIUM LACTATE, POTASSIUM CHLORIDE, CALCIUM CHLORIDE: 600; 310; 30; 20 INJECTION, SOLUTION INTRAVENOUS at 10:40:00

## 2018-09-11 RX ADMIN — DEXAMETHASONE SODIUM PHOSPHATE 10 MG: 10 INJECTION, SOLUTION INTRAMUSCULAR; INTRAVENOUS at 10:01:00

## 2018-09-11 RX ADMIN — ONDANSETRON 4 MG: 2 INJECTION INTRAMUSCULAR; INTRAVENOUS at 10:49:00

## 2018-09-11 RX ADMIN — SODIUM CHLORIDE, SODIUM LACTATE, POTASSIUM CHLORIDE, CALCIUM CHLORIDE: 600; 310; 30; 20 INJECTION, SOLUTION INTRAVENOUS at 12:40:00

## 2018-09-11 RX ADMIN — CEFAZOLIN SODIUM/WATER 2 G: 2 G/20 ML SYRINGE (ML) INTRAVENOUS at 10:43:00

## 2018-09-11 RX ADMIN — LIDOCAINE HYDROCHLORIDE 5 ML: 10 INJECTION, SOLUTION INFILTRATION; PERINEURAL at 10:04:00

## 2018-09-11 RX ADMIN — LIDOCAINE HYDROCHLORIDE 5 ML: 10 INJECTION, SOLUTION INFILTRATION; PERINEURAL at 10:01:00

## 2018-09-11 RX ADMIN — ROPIVACAINE HYDROCHLORIDE 30 ML: 5 INJECTION, SOLUTION EPIDURAL; INFILTRATION; PERINEURAL at 10:01:00

## 2018-09-11 RX ADMIN — ROPIVACAINE HYDROCHLORIDE 30 ML: 5 INJECTION, SOLUTION EPIDURAL; INFILTRATION; PERINEURAL at 10:04:00

## 2018-09-11 NOTE — ANESTHESIA PROCEDURE NOTES
Peripheral Block  Date/Time: 9/11/2018 10:01 AM    Anesthesiologist:  Brian Jasso MD  Patient Location:  PACU  Start Time:  9/11/2018 9:54 AM  End Time:  9/11/2018 10:02 AM  Site Identification: ultrasound guided, real time ultrasound guided and IMAG

## 2018-09-11 NOTE — ANESTHESIA PROCEDURE NOTES
Peripheral Block    Anesthesiologist:  Gloria Huber MD  Patient Location:  PACU  Start Time:  9/11/2018 10:03 AM  End Time:  9/11/2018 10:05 AM  Site Identification: ultrasound guided, real time ultrasound guided and IMAGE STORED AND RETRIEVABLE    Re

## 2018-09-11 NOTE — H&P
History & Physical Examination    Patient Name: Tabitha Fleming  MRN: [id number]  CSN: [id number]  YOB: 1940    Diagnosis: right equinovalgus, PTTD    Present Illness: 71 y/o with right painful equinovalgus, failed conservative care      Medica ([facility] 10/26/2017   • Muscle weakness     kiran legs but improving after stopping Crestor   • Osteoarthrosis, unspecified whether generalized or localized, unspecified site    • Personal history of antineoplastic chemotherapy 2007   • Pulmonary emphysema ([facility]. Plan right flatfoot reconstruction    Ricky Villalobos [facility]  9/11/2018  10:39 AM

## 2018-09-11 NOTE — ANESTHESIA PREPROCEDURE EVALUATION
Anesthesia PreOp Note    HPI:     Nicci Blum is a 70year old female who presents for preoperative consultation requested by: Elisabet Robertson MD    Date of Surgery: 9/11/2018    Procedure(s):  FOOT OSTEOTOMY  Indication: Acquired talipes equinovalgus Osteoarthrosis, unspecified whether generalized or localized, unspecified site    • Personal history of antineoplastic chemotherapy 2007   • Pulmonary emphysema [facility])        Past Surgical History:  No date: APPENDECTOMY  11/2017: CATARACT  2015: Wesley Seals file.      Augmentin [Amoclan]     PAIN    Comment:Stomach pain / indigestion  Cefdinir                DIARRHEA  Clarithromycin              Comment:Other reaction(s): Gastritis  Codeine                 NAUSEA AND VOMITING  Crestor [Rosuvastat*    OTHER (S caffeine: 1 cup decaf      blood tranfusion: no      hiv exposure: no      travel: domestic;leved in [place] 4 yrs 20 yrs ago      exercise: gym; walks treadmill      mammo: 2011      dexa: never      pap: yoan bso 2009      colonoscopy: 2012      lab: DR Romeo Obrien Pain Management: IV analgesics, Popliteal block and Saphenous block  Informed Consent Plan and Risks Discussed With:  Patient  Discussed plan with:  CRNA      I have informed Raisa Garcia and/or legal guardian or family member of the nature of the anesthe

## 2018-09-11 NOTE — ANESTHESIA PROCEDURE NOTES
ANESTHESIA INTUBATION  Date/Time: 9/11/2018 10:46 AM  Urgency: elective      General Information and Staff    Patient location during procedure: OR  Anesthesiologist: Mandy Lyons MD  Resident/CRNA: Veronica Bowden CRNA  Performed: CRNA     Indic

## 2018-09-11 NOTE — OPERATIVE REPORT
[facility]    PATIENT'S NAME: Gracy Duane   ATTENDING PHYSICIAN: Steve Gomes MD   OPERATING PHYSICIAN: Steve Gomes MD   PATIENT ACCOUNT#:   [id number]    LOCATION:  [address] [place] [address] 53 #:   [id number]       DATE OF BIRTH:  [birth year] made over the medial head of the gastrocnemius. We dissected down through the subcutaneous tissue, incised the deep fascia, developed the interval between the gastroc and the soleus.   We then released the gastroc aponeurosis with a 15 blade for an intramu visualization. We then isolated the sustentaculum dalila. We drilled a 3.0 mm drill into the sustentaculum dalila and then tapped this, placed a 3.5 mm SwiveLock with the FiberTape attached.   We then reamed with a 5 mm reamer over [facility]

## 2018-09-11 NOTE — H&P
[facility] Hospitalist Team  History and Physical     ASSESSMENT / PLAN:   69 yo female with hx anal cancer, recent vulvar lesion removal-bx with condyloma, emphysema, pulmonary nodules and right foot pain.  Pt is S/P Right Calcaneal Osteotomy, gastroc slide, Flex urinate. Has scooter at home and has practiced it.  Tells me her daughter is coming to stay in hospital tonight and will take her home    OBJECTIVE:  /70 (BP Location: Right arm)   Pulse 76   Temp 97.5 °F (36.4 °C) (Oral)   Resp 18   Ht 151.4 cm (4' 1 DIARRHEA  Clarithromycin              Comment:Other reaction(s): Gastritis  Codeine                 NAUSEA AND VOMITING  Crestor [Rosuvastat*    OTHER (SEE COMMENTS)    Comment:wekness in legs  Levaquin                UNKNOWN  Vytorin LIPASE, LDH in the last 168 hours. Invalid input(s): ALPHOS, TBIL, DBIL, TPROT    No results for input(s): TROP in the last 168 hours.   Radiology: Xr Heel (calcaneus) (min 2 Views), Right (cpt=73650)    Result Date: 9/11/2018  CONCLUSION:  Stabilization 14. 7  -Bowel reg, antiemetics  -DVT-resume ASA at D/C unless other wise directed by ortho  -PT/OT/SW-has scooter at home, NWB right foot  -as per ortho    OWEN  -Cr 2016 0.79, pre op Cr 1.17 with GFR 45  -follow post op    Emphysema  -prn albuterol    Pulmo

## 2018-09-11 NOTE — ANESTHESIA PROCEDURE NOTES
Peripheral Block  Date/Time: 9/11/2018 10:04 AM    Anesthesiologist:  Berenice Robertson MD  Performed by:   Anesthesiologist  Patient Location:  PACU  Start Time:  9/11/2018 10:02 AM  End Time:  9/11/2018 10:06 AM  Site Identification: ultrasound guided, re

## 2018-09-11 NOTE — ANESTHESIA POSTPROCEDURE EVALUATION
Patient: Demarcus Raderlar    Procedure Summary     Date:  09/11/18 Room / Location:  [address] [place] [address] MAIN OR 08 / [address] [place] [address] MAIN OR    Anesthesia Start:  0356 Anesthesia Stop:  9581    Procedure:  FOOT OSTEOTOMY (Right Foot) Diagnosis:       Acquired talipes equinovalgus, righ

## 2018-09-12 VITALS
HEIGHT: 59.6 IN | HEART RATE: 66 BPM | DIASTOLIC BLOOD PRESSURE: 49 MMHG | RESPIRATION RATE: 18 BRPM | BODY MASS INDEX: 28.05 KG/M2 | TEMPERATURE: 98 F | SYSTOLIC BLOOD PRESSURE: 107 MMHG | WEIGHT: 141 LBS | OXYGEN SATURATION: 94 %

## 2018-09-12 LAB
ANION GAP SERPL CALC-SCNC: 6 MMOL/L (ref 0–18)
BASOPHILS # BLD: 0 K/UL (ref 0–0.2)
BASOPHILS NFR BLD: 0 %
BUN SERPL-MCNC: 17 MG/DL (ref 8–20)
BUN/CREAT SERPL: 18.5 (ref 10–20)
CALCIUM SERPL-MCNC: 8.9 MG/DL (ref 8.5–10.5)
CHLORIDE SERPL-SCNC: 105 MMOL/L (ref 95–110)
CO2 SERPL-SCNC: 27 MMOL/L (ref 22–32)
CREAT SERPL-MCNC: 0.92 MG/DL (ref 0.5–1.5)
EOSINOPHIL # BLD: 0 K/UL (ref 0–0.7)
EOSINOPHIL NFR BLD: 0 %
ERYTHROCYTE [DISTWIDTH] IN BLOOD BY AUTOMATED COUNT: 13.9 % (ref 11–15)
GLUCOSE SERPL-MCNC: 147 MG/DL (ref 70–99)
HBA1C MFR BLD: 5.6 % (ref 4–6)
HCT VFR BLD AUTO: 40.6 % (ref 35–48)
HGB BLD-MCNC: 13.4 G/DL (ref 12–16)
LYMPHOCYTES # BLD: 0.8 K/UL (ref 1–4)
LYMPHOCYTES NFR BLD: 8 %
MCH RBC QN AUTO: 28.7 PG (ref 27–32)
MCHC RBC AUTO-ENTMCNC: 32.9 G/DL (ref 32–37)
MCV RBC AUTO: 87.2 FL (ref 80–100)
MONOCYTES # BLD: 0.6 K/UL (ref 0–1)
MONOCYTES NFR BLD: 5 %
NEUTROPHILS # BLD AUTO: 9.1 K/UL (ref 1.8–7.7)
NEUTROPHILS NFR BLD: 87 %
OSMOLALITY UR CALC.SUM OF ELEC: 290 MOSM/KG (ref 275–295)
PLATELET # BLD AUTO: 234 K/UL (ref 140–400)
PMV BLD AUTO: 7.3 FL (ref 7.4–10.3)
POTASSIUM SERPL-SCNC: 4.2 MMOL/L (ref 3.3–5.1)
RBC # BLD AUTO: 4.65 M/UL (ref 3.7–5.4)
SODIUM SERPL-SCNC: 138 MMOL/L (ref 136–144)
WBC # BLD AUTO: 10.5 K/UL (ref 4–11)

## 2018-09-12 PROCEDURE — 97166 OT EVAL MOD COMPLEX 45 MIN: CPT

## 2018-09-12 PROCEDURE — 83036 HEMOGLOBIN GLYCOSYLATED A1C: CPT | Performed by: NURSE PRACTITIONER

## 2018-09-12 PROCEDURE — 97116 GAIT TRAINING THERAPY: CPT

## 2018-09-12 PROCEDURE — 97162 PT EVAL MOD COMPLEX 30 MIN: CPT

## 2018-09-12 PROCEDURE — 97535 SELF CARE MNGMENT TRAINING: CPT

## 2018-09-12 PROCEDURE — 85025 COMPLETE CBC W/AUTO DIFF WBC: CPT | Performed by: NURSE PRACTITIONER

## 2018-09-12 PROCEDURE — A4216 STERILE WATER/SALINE, 10 ML: HCPCS | Performed by: PHYSICIAN ASSISTANT

## 2018-09-12 PROCEDURE — 97110 THERAPEUTIC EXERCISES: CPT

## 2018-09-12 PROCEDURE — 80048 BASIC METABOLIC PNL TOTAL CA: CPT | Performed by: NURSE PRACTITIONER

## 2018-09-12 RX ORDER — HYDROCODONE BITARTRATE AND ACETAMINOPHEN 10; 325 MG/1; MG/1
TABLET ORAL
Qty: 1 TABLET | Refills: 0 | Status: SHIPPED | OUTPATIENT
Start: 2018-09-12 | End: 2018-10-15

## 2018-09-12 RX ORDER — PSEUDOEPHEDRINE HCL 30 MG
100 TABLET ORAL 2 TIMES DAILY
Qty: 60 CAPSULE | Refills: 0 | Status: SHIPPED | OUTPATIENT
Start: 2018-09-12 | End: 2019-01-18

## 2018-09-12 RX ORDER — ASPIRIN 325 MG
325 TABLET, DELAYED RELEASE (ENTERIC COATED) ORAL 2 TIMES DAILY
Qty: 60 TABLET | Refills: 0 | Status: SHIPPED | OUTPATIENT
Start: 2018-09-12 | End: 2021-08-22 | Stop reason: CLARIF

## 2018-09-12 NOTE — CM/SW NOTE
CHATA met with the pt. And her dtr. At bedside. The pt. Lives alone in a 2nd floor condo with elevator access. The pt. Reports being independent prior to admission with adls, ambulation with crutches and driving. The pt.  Has 6 children, 1 living in the are

## 2018-09-12 NOTE — OCCUPATIONAL THERAPY NOTE
OCCUPATIONAL THERAPY EVALUATION - INPATIENT      Room Number: 502/528-J  Evaluation Date: 9/12/2018  Type of Evaluation: Initial  Presenting Problem: R painful equinovalgus    Physician Order: IP Consult to Occupational Therapy  Reason for Therapy: ADL/IAD training;UE strengthening/ROM; Endurance training;Equipment eval/education; Compensatory technique education       OCCUPATIONAL THERAPY MEDICAL/SOCIAL HISTORY     Problem List   Principal Problem:    Sx 9/11/18, Right Gastroc Slide, Lat Calc Osteotomy, FDL t scooter training with PT this PM    SUBJECTIVE  Role of OT, activity promotion, NWB status    OCCUPATIONAL THERAPY EXAMINATION     OBJECTIVE  Precautions: (RLE elevation prevent swelling)  Fall Risk: Standard fall risk    WEIGHT BEARING RESTRICTION  Weight compensatory strategies, fall prevention strategies     Patient End of Session: In bed;Needs met;Call light within reach;RN aware of session/findings    OT Goals  Patient self-stated goal is: return home with help      Patient will complete LE dressing wit

## 2018-09-12 NOTE — HOME CARE LIAISON
Met with patient at the bedside. Patient is agreeable to [facility]. Brochure and liaison's business card provided with contact information. All questions addressed and answered.

## 2018-09-12 NOTE — PHYSICAL THERAPY NOTE
PHYSICAL THERAPY EVALUATION - INPATIENT     Room Number: 953/651-A  Evaluation Date: 9/12/2018  Type of Evaluation: Initial   Physician Order: PT Eval and Treat    Presenting Problem: RLE calcanial oseotomypost tibial tendon transfer, gastroc slide lat ca History:   Diagnosis Date   • Anal cancer ([facility] 2007    invasive basaloid squamous cell carcinoma, s/p chemo/XRT.  Reagan Villalta; Dr Estrella Masters   • Back problem     disc problems   • Cancer [facility])    • Exposure to unspecified radiation 2007   • Lumbar arthropathy techniques;Repositioning;Relaxation    COGNITION  · Overall Cognitive Status:  WFL - within functional limits    RANGE OF MOTION AND STRENGTH ASSESSMENT  Upper extremity ROM and strength are within functional limits     Lower extremity ROM is within functi staff;Needs met;Call light within reach;RN aware of session/findings; All patient questions and concerns addressed;SCDs in place; Ice applied    CURRENT GOALS    Goals to be met by: 2 wks  Patient Goal Patient's self-stated goal is: Return home   Goal #1 Cortney Bloom

## 2018-09-12 NOTE — PHYSICAL THERAPY NOTE
PT PM session: Pt education completed for gait training with a knee scooter with ' with RLE NWB with pt and daughter.  Pt demonstrates adequate functional knowledge and safety for a home D/C with daughter intermittent assist and OP PT only when progr

## 2018-09-12 NOTE — DISCHARGE SUMMARY
[facility] Hospitalist Discharge Summary   Patient ID:  Nicci Blum  [id number]  81 year old  8/13/1940    Admit date: 9/11/2018  Discharge date: 9/12/2018    Primary Care Physician: Kristel Kelly MD   Attending Physician: Elisabet Robertson MD   Consults:   [facility]  -prn norco   -expected acute blood loss anemia, preop hemoglobin per outpatient chart review 14.7-->13.4  -Bowel reg-colace  -DVT- mg BID until ambulatory then decrease to usual 162 mg daily   -HHC scooter at home, NWB right foot  -follow u STOP taking these medications    TYLENOL OR           Where to Get Your Medications      You can get these medications from any pharmacy    Bring a paper prescription for each of these medications  · docusate sodium 100 MG Caps  · HYDROcodone-acetamin Ht 151.4 cm (4' 11.6\")   Wt 141 lb (64 kg)   SpO2 94%   BMI 27.91 kg/m²     Exam:  GEN: NAD  HEENT: EOMI, PERRLA  Neck: Supple, no JVD  Pulm: CTAB, no crackles or wheezes  CV: RRR, no murmurs   ABD: Soft, non-tender, non-distended, +BS  Neuro: Grossly no

## 2018-09-12 NOTE — PROGRESS NOTES
No Complaints   09/12/18  0346   BP: 107/49   Pulse: 66   Resp: 18   Temp: 97.9 °F (36.6 °C)     Lab Results   Component Value Date    WBC 10.5 09/12/2018    HGB 13.4 09/12/2018    HCT 40.6 09/12/2018     09/12/2018    CREATSERUM 0.92 09/12/2018

## 2018-10-18 PROBLEM — M76.821 POSTERIOR TIBIAL TENDON DYSFUNCTION (PTTD) OF RIGHT LOWER EXTREMITY: Chronic | Status: ACTIVE | Noted: 2018-08-20

## 2018-12-07 ENCOUNTER — HOSPITAL (OUTPATIENT)
Dept: OTHER | Age: 78
End: 2018-12-07
Attending: INTERNAL MEDICINE

## 2018-12-13 PROBLEM — E04.1 THYROID NODULE: Status: ACTIVE | Noted: 2018-12-13

## 2018-12-31 ENCOUNTER — PRIOR ORIGINAL RECORDS (OUTPATIENT)
Dept: OTHER | Age: 78
End: 2018-12-31

## 2019-01-02 ENCOUNTER — LAB ENCOUNTER (OUTPATIENT)
Dept: LAB | Age: 79
End: 2019-01-02
Attending: SURGERY
Payer: MEDICARE

## 2019-01-02 DIAGNOSIS — E04.2 NONTOXIC MULTINODULAR GOITER: ICD-10-CM

## 2019-01-02 PROCEDURE — 88173 CYTOPATH EVAL FNA REPORT: CPT

## 2019-01-08 NOTE — PROGRESS NOTES
Melani Beasley, please notify patient that biopsy was indeterminate and will need AFIRMA molecular study  Return at her convenience for the biopsy

## 2019-01-18 PROBLEM — R07.9 CHEST PAIN, UNSPECIFIED TYPE: Status: ACTIVE | Noted: 2019-01-18

## 2019-01-18 PROBLEM — R94.31 ABNORMAL EKG: Status: ACTIVE | Noted: 2019-01-18

## 2019-01-18 PROBLEM — E78.2 MIXED HYPERLIPIDEMIA: Status: ACTIVE | Noted: 2019-01-18

## 2019-02-21 ENCOUNTER — PRIOR ORIGINAL RECORDS (OUTPATIENT)
Dept: OTHER | Age: 79
End: 2019-02-21

## 2019-02-22 PROBLEM — R94.31 ABNORMAL EKG: Status: RESOLVED | Noted: 2019-01-18 | Resolved: 2019-02-22

## 2019-02-22 PROBLEM — Z01.818 PREOPERATIVE EVALUATION TO RULE OUT SURGICAL CONTRAINDICATION: Status: RESOLVED | Noted: 2018-08-20 | Resolved: 2019-02-22

## 2019-02-22 PROBLEM — R07.9 CHEST PAIN, UNSPECIFIED TYPE: Status: RESOLVED | Noted: 2019-01-18 | Resolved: 2019-02-22

## 2019-02-24 ENCOUNTER — PRIOR ORIGINAL RECORDS (OUTPATIENT)
Dept: OTHER | Age: 79
End: 2019-02-24

## 2019-02-26 ENCOUNTER — ANESTHESIA EVENT (OUTPATIENT)
Dept: SURGERY | Facility: HOSPITAL | Age: 79
End: 2019-02-26

## 2019-02-26 ENCOUNTER — HOSPITAL ENCOUNTER (OUTPATIENT)
Facility: HOSPITAL | Age: 79
Discharge: HOME | End: 2019-02-26
Attending: SURGERY | Admitting: SURGERY
Payer: MEDICARE

## 2019-02-26 ENCOUNTER — ANESTHESIA (OUTPATIENT)
Dept: SURGERY | Facility: HOSPITAL | Age: 79
End: 2019-02-26

## 2019-02-26 VITALS
HEIGHT: 59 IN | OXYGEN SATURATION: 96 % | RESPIRATION RATE: 16 BRPM | DIASTOLIC BLOOD PRESSURE: 65 MMHG | WEIGHT: 137.13 LBS | BODY MASS INDEX: 27.64 KG/M2 | TEMPERATURE: 98 F | HEART RATE: 68 BPM | SYSTOLIC BLOOD PRESSURE: 121 MMHG

## 2019-02-26 DIAGNOSIS — E04.1 NONTOXIC UNINODULAR GOITER: ICD-10-CM

## 2019-02-26 PROCEDURE — 0GTG0ZZ RESECTION OF LEFT THYROID GLAND LOBE, OPEN APPROACH: ICD-10-PCS | Performed by: SURGERY

## 2019-02-26 PROCEDURE — 4A1174G MONITORING OF PERIPHERAL NERVOUS ELECTRICAL ACTIVITY, INTRAOPERATIVE, VIA NATURAL OR ARTIFICIAL OPENING: ICD-10-PCS | Performed by: SURGERY

## 2019-02-26 PROCEDURE — 88307 TISSUE EXAM BY PATHOLOGIST: CPT | Performed by: SURGERY

## 2019-02-26 PROCEDURE — 88331 PATH CONSLTJ SURG 1 BLK 1SPC: CPT | Performed by: SURGERY

## 2019-02-26 RX ORDER — ACETAMINOPHEN 500 MG
1000 TABLET ORAL ONCE
Status: CANCELLED | OUTPATIENT
Start: 2019-02-26 | End: 2019-02-26

## 2019-02-26 RX ORDER — IBUPROFEN 800 MG/1
800 TABLET ORAL EVERY 8 HOURS PRN
Qty: 30 TABLET | Refills: 1 | Status: SHIPPED | OUTPATIENT
Start: 2019-02-26 | End: 2019-03-13

## 2019-02-26 RX ORDER — NEOMYCIN SULFATE, POLYMYXIN B SULFATE, HYDROCORTISONE 3.5; 10000; 1 MG/ML; [USP'U]/ML; MG/ML
4 SOLUTION/ DROPS AURICULAR (OTIC) SEE ADMIN INSTRUCTIONS
COMMUNITY
End: 2019-06-18 | Stop reason: ALTCHOICE

## 2019-02-26 RX ORDER — NALOXONE HYDROCHLORIDE 0.4 MG/ML
80 INJECTION, SOLUTION INTRAMUSCULAR; INTRAVENOUS; SUBCUTANEOUS AS NEEDED
Status: DISCONTINUED | OUTPATIENT
Start: 2019-02-26 | End: 2019-02-26

## 2019-02-26 RX ORDER — ACETAMINOPHEN 500 MG
1000 TABLET ORAL ONCE
Status: COMPLETED | OUTPATIENT
Start: 2019-02-26 | End: 2019-02-26

## 2019-02-26 RX ORDER — ACETAMINOPHEN 500 MG
TABLET ORAL
Status: COMPLETED
Start: 2019-02-26 | End: 2019-02-26

## 2019-02-26 RX ORDER — DIPHENHYDRAMINE HYDROCHLORIDE 50 MG/ML
12.5 INJECTION INTRAMUSCULAR; INTRAVENOUS AS NEEDED
Status: DISCONTINUED | OUTPATIENT
Start: 2019-02-26 | End: 2019-02-26

## 2019-02-26 RX ORDER — HYDROCODONE BITARTRATE AND ACETAMINOPHEN 5; 325 MG/1; MG/1
1-2 TABLET ORAL EVERY 4 HOURS PRN
Qty: 10 TABLET | Refills: 0 | Status: SHIPPED | OUTPATIENT
Start: 2019-02-26 | End: 2019-03-13

## 2019-02-26 RX ORDER — OXYCODONE HYDROCHLORIDE 5 MG/1
5 TABLET ORAL ONCE AS NEEDED
Status: DISCONTINUED | OUTPATIENT
Start: 2019-02-26 | End: 2019-02-26

## 2019-02-26 RX ORDER — HYDROMORPHONE HYDROCHLORIDE 1 MG/ML
INJECTION, SOLUTION INTRAMUSCULAR; INTRAVENOUS; SUBCUTANEOUS
Status: COMPLETED
Start: 2019-02-26 | End: 2019-02-26

## 2019-02-26 RX ORDER — LABETALOL HYDROCHLORIDE 5 MG/ML
5 INJECTION, SOLUTION INTRAVENOUS EVERY 5 MIN PRN
Status: DISCONTINUED | OUTPATIENT
Start: 2019-02-26 | End: 2019-02-26

## 2019-02-26 RX ORDER — ACETAMINOPHEN 500 MG
1000 TABLET ORAL ONCE AS NEEDED
Status: DISCONTINUED | OUTPATIENT
Start: 2019-02-26 | End: 2019-02-26

## 2019-02-26 RX ORDER — SODIUM CHLORIDE, SODIUM LACTATE, POTASSIUM CHLORIDE, CALCIUM CHLORIDE 600; 310; 30; 20 MG/100ML; MG/100ML; MG/100ML; MG/100ML
INJECTION, SOLUTION INTRAVENOUS CONTINUOUS
Status: DISCONTINUED | OUTPATIENT
Start: 2019-02-26 | End: 2019-02-26

## 2019-02-26 RX ORDER — HYDROCODONE BITARTRATE AND ACETAMINOPHEN 5; 325 MG/1; MG/1
2 TABLET ORAL AS NEEDED
Status: COMPLETED | OUTPATIENT
Start: 2019-02-26 | End: 2019-02-26

## 2019-02-26 RX ORDER — ONDANSETRON 2 MG/ML
4 INJECTION INTRAMUSCULAR; INTRAVENOUS AS NEEDED
Status: DISCONTINUED | OUTPATIENT
Start: 2019-02-26 | End: 2019-02-26

## 2019-02-26 RX ORDER — SODIUM CHLORIDE, SODIUM LACTATE, POTASSIUM CHLORIDE, CALCIUM CHLORIDE 600; 310; 30; 20 MG/100ML; MG/100ML; MG/100ML; MG/100ML
INJECTION, SOLUTION INTRAVENOUS CONTINUOUS
Status: CANCELLED | OUTPATIENT
Start: 2019-02-26

## 2019-02-26 RX ORDER — METOCLOPRAMIDE HYDROCHLORIDE 5 MG/ML
10 INJECTION INTRAMUSCULAR; INTRAVENOUS AS NEEDED
Status: DISCONTINUED | OUTPATIENT
Start: 2019-02-26 | End: 2019-02-26

## 2019-02-26 RX ORDER — MIDAZOLAM HYDROCHLORIDE 1 MG/ML
1 INJECTION INTRAMUSCULAR; INTRAVENOUS EVERY 5 MIN PRN
Status: DISCONTINUED | OUTPATIENT
Start: 2019-02-26 | End: 2019-02-26

## 2019-02-26 RX ORDER — MEPERIDINE HYDROCHLORIDE 25 MG/ML
12.5 INJECTION INTRAMUSCULAR; INTRAVENOUS; SUBCUTANEOUS AS NEEDED
Status: DISCONTINUED | OUTPATIENT
Start: 2019-02-26 | End: 2019-02-26

## 2019-02-26 RX ORDER — HYDROCODONE BITARTRATE AND ACETAMINOPHEN 5; 325 MG/1; MG/1
1 TABLET ORAL AS NEEDED
Status: COMPLETED | OUTPATIENT
Start: 2019-02-26 | End: 2019-02-26

## 2019-02-26 RX ORDER — HYDROMORPHONE HYDROCHLORIDE 1 MG/ML
0.4 INJECTION, SOLUTION INTRAMUSCULAR; INTRAVENOUS; SUBCUTANEOUS EVERY 5 MIN PRN
Status: DISCONTINUED | OUTPATIENT
Start: 2019-02-26 | End: 2019-02-26

## 2019-02-26 RX ORDER — BUPIVACAINE HYDROCHLORIDE 5 MG/ML
INJECTION, SOLUTION EPIDURAL; INTRACAUDAL AS NEEDED
Status: DISCONTINUED | OUTPATIENT
Start: 2019-02-26 | End: 2019-02-26 | Stop reason: HOSPADM

## 2019-02-26 NOTE — H&P
[facility]  Report of history and physical    Lucrecia Spencer Patient Status:  Outpatient in a Bed    1940 MRN [id number]   Location [facility] Attending Talib Castro MD   Hosp Day # 0 PCP Lanny Vargas MD     Reason for myrick REDUCTION LEFT  1970's   • REDUCTION RIGHT  18's     Family History   Problem Relation Age of Onset   • Hypertension Father    • Cancer Father    • Lipids Mother    • Other (Other) Mother         carotid surgery   • Heart Disorder Sister         angiopla temperature 97.9 °F (36.6 °C), temperature source Oral, resp. rate 16, height 4' 11\" (1.499 m), weight 137 lb 2 oz (62.2 kg), SpO2 96 %, not currently breastfeeding. General: Alert, orientated x3. Cooperative. No apparent distress.   HEENT: Exam is un evaluation.  -Suspicious for follicular neoplasm.    -Hypercellular thyroid aspirate comprised exlusively of Hurthle cells with scant background colloid.   -(See comment.)   Electronically signed by Latisha Fernandez MD on 1/8/2019      AFIRMA - suspicious

## 2019-02-26 NOTE — ANESTHESIA PREPROCEDURE EVALUATION
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                 Neuro/Psych      (+) depression                              Past Surgical History:   Procedure Laterality Date   • APPENDECTOMY     • CATARACT  11/2017   • COLONOSCOPY  2015    normal   • FOOT OSTEOTOMY Right 9/11/2018    Perfo findings            ASA: 2   Plan: general  NPO status verified and patient meets guidelines.           Plan/risks discussed with: patient and child/children                Present on Admission:  **None**

## 2019-02-26 NOTE — ANESTHESIA POSTPROCEDURE EVALUATION
[address] Patient Status:  Outpatient in a Bed   Age/Gender 70year old female MRN [id number]   [facility] SURGERY Attending Walt Posada MD   Hosp Day # 0 PCP Tramaine Funes MD       Anesthesia Post-op Note    Procedu

## 2019-02-26 NOTE — BRIEF OP NOTE
Pre-Operative Diagnosis: Nontoxic uninodular goiter [E04.1]     Post-Operative Diagnosis: Nontoxic uninodular goiter [E04.1]      Procedure Performed:   LEFT THYROID LOBECOTMY WITH INTRAOPERATIVE FROZEN SECTION    Surgeon(s) and Role:     Castillo Rivero,

## 2019-02-26 NOTE — OPERATIVE REPORT
[address] [place]    PATIENT'S NAME: Elena Minors   ATTENDING PHYSICIAN: Max Camacho M.D. OPERATING PHYSICIAN: Max Camacho M.D.    PATIENT ACCOUNT#:   [de-identified]    LOCATION:  OR  OR [place] ROOMS 11 EDWP [address] #:   [id number]       DATE OF BI was prepped and draped into a sterile field. Lower [place] neck incision was made with a 15 blade knife. Electric Bovie cautery was used to separate the subcutaneous tissue, and superior and inferior flaps were raised.   Deep cervical fascia was identified

## 2019-03-13 ENCOUNTER — OFFICE VISIT (OUTPATIENT)
Dept: UROLOGY | Facility: HOSPITAL | Age: 79
End: 2019-03-13
Attending: OBSTETRICS & GYNECOLOGY
Payer: MEDICARE

## 2019-03-13 VITALS
DIASTOLIC BLOOD PRESSURE: 84 MMHG | WEIGHT: 137 LBS | SYSTOLIC BLOOD PRESSURE: 140 MMHG | BODY MASS INDEX: 27.62 KG/M2 | HEIGHT: 59 IN

## 2019-03-13 DIAGNOSIS — N81.84 PELVIC MUSCLE WASTING: Primary | ICD-10-CM

## 2019-03-13 DIAGNOSIS — N81.6 RECTOCELE: ICD-10-CM

## 2019-03-13 DIAGNOSIS — Z85.048 HISTORY OF RECTAL OR ANAL CANCER: ICD-10-CM

## 2019-03-13 DIAGNOSIS — N81.11 CYSTOCELE, MIDLINE: ICD-10-CM

## 2019-03-13 DIAGNOSIS — N95.2 VAGINAL ATROPHY: ICD-10-CM

## 2019-03-13 PROCEDURE — 51798 US URINE CAPACITY MEASURE: CPT

## 2019-03-13 PROCEDURE — 99201 HC OUTPT EVAL AND MGNT NEW PT LEVEL 1: CPT

## 2019-03-13 RX ORDER — ESTRADIOL 0.1 MG/G
CREAM VAGINAL
Qty: 1 TUBE | Refills: 3 | Status: SHIPPED | OUTPATIENT
Start: 2019-03-13 | End: 2019-08-28

## 2019-03-13 NOTE — PROGRESS NOTES
ID: Debbie Del Rio  : 1940  Date: 3/13/2019       Patient presents with:  Prolapse      HPI:  The patient is a 70year-old female,  (vaginal), who presents for evaluation of vaginal prolapse symptoms in the last 6 months to a year.  She can feel Surgical History:   Procedure Laterality Date   • APPENDECTOMY     • CATARACT  11/2017   • COLONOSCOPY  2015    normal   • FOOT OSTEOTOMY Right 9/11/2018    Performed by Kaylee Sarkar MD at [address]   • FOOT SURGERY Right 09/11/2018    [place]   • Medications Prior to Visit:  Neomycin-Polymyxin-HC 1 % Otic Solution 4 drops See Admin Instructions. Use 4 drops into affected area BID. Disp:  Rfl:    aspirin  MG Oral Tab EC Take 1 tablet (325 mg total) by mouth 2 (two) times daily.  Disp: 60 table color and turgor. No lesions.     PELVIC EXAM:  External Genitalia: Normal appearance for age. + atrophy, no lesions  Urethra: + atrophy, non tender  Bladder: no fullness, non tender  Vagina: + atrophy, no lesions, no abnormal discharge   Cervix: surgically

## 2019-03-13 NOTE — PROCEDURES
[facility] Pelvic Medicine  Bladder Scanner Note    Date:  3/13/2019    Patient Name:  Inessa Romano  Patient :  1940   Patient MRN:  [id number]  Patient Age:  70year old      Diagnosis:  Post Void Residual    Proce

## 2019-03-19 PROCEDURE — 88175 CYTOPATH C/V AUTO FLUID REDO: CPT | Performed by: OBSTETRICS & GYNECOLOGY

## 2019-03-19 PROCEDURE — 87624 HPV HI-RISK TYP POOLED RSLT: CPT | Performed by: OBSTETRICS & GYNECOLOGY

## 2019-03-25 PROBLEM — E89.0 S/P PARTIAL THYROIDECTOMY: Status: ACTIVE | Noted: 2019-03-25

## 2019-03-29 ENCOUNTER — TELEPHONE (OUTPATIENT)
Dept: UROLOGY | Facility: HOSPITAL | Age: 79
End: 2019-03-29

## 2019-03-29 NOTE — TELEPHONE ENCOUNTER
Patient received copy of PT order and is very upset because the diagnosis is fecal incontinence. Adamantly denies that she does not have fecal incontinence and reading this is extremely upsetting.  Reports that she does not have problems with fecal incontin

## 2019-04-02 ENCOUNTER — TELEPHONE (OUTPATIENT)
Dept: UROLOGY | Facility: HOSPITAL | Age: 79
End: 2019-04-02

## 2019-04-02 DIAGNOSIS — N81.11 CYSTOCELE, MIDLINE: Primary | ICD-10-CM

## 2019-04-02 DIAGNOSIS — N81.6 RECTOCELE: ICD-10-CM

## 2019-04-02 NOTE — TELEPHONE ENCOUNTER
Spoke to Dr. Jaymie Pizarro regarding pt request for different dx for pelvic floor therapy orders placed per last phone encounter. TORB Pt to have pelvic floor therapy with dx of cystocele/rectocele. Orders placed as directed.   Contacted pt and notified of new

## 2019-06-04 PROBLEM — M18.11 PRIMARY OSTEOARTHRITIS OF FIRST CARPOMETACARPAL JOINT OF RIGHT HAND: Status: ACTIVE | Noted: 2019-06-04

## 2019-06-18 PROBLEM — H25.13 AGE-RELATED NUCLEAR CATARACT OF BOTH EYES: Status: ACTIVE | Noted: 2017-10-03

## 2019-06-18 PROBLEM — H04.123 DRY EYE SYNDROME OF BILATERAL LACRIMAL GLANDS: Status: ACTIVE | Noted: 2018-01-18

## 2019-06-24 ENCOUNTER — ORDER TRANSCRIPTION (OUTPATIENT)
Dept: CARDIAC REHAB | Facility: HOSPITAL | Age: 79
End: 2019-06-24

## 2019-06-24 DIAGNOSIS — J43.9 EMPHYSEMA LUNG (HCC): Primary | ICD-10-CM

## 2019-09-11 ENCOUNTER — HOSPITAL (OUTPATIENT)
Dept: OTHER | Age: 79
End: 2019-09-11
Attending: INTERNAL MEDICINE

## 2019-10-22 PROBLEM — H04.123 DRY EYE SYNDROME OF BILATERAL LACRIMAL GLANDS: Status: RESOLVED | Noted: 2018-01-18 | Resolved: 2019-10-22

## 2019-10-22 PROBLEM — H25.13 AGE-RELATED NUCLEAR CATARACT OF BOTH EYES: Status: RESOLVED | Noted: 2017-10-03 | Resolved: 2019-10-22

## 2019-10-25 PROBLEM — M17.12 PRIMARY OSTEOARTHRITIS OF LEFT KNEE: Status: ACTIVE | Noted: 2019-10-25

## 2019-12-12 ENCOUNTER — APPOINTMENT (OUTPATIENT)
Dept: CARDIAC REHAB | Facility: HOSPITAL | Age: 79
End: 2019-12-12
Attending: INTERNAL MEDICINE
Payer: MEDICARE

## 2019-12-31 ENCOUNTER — PRIOR ORIGINAL RECORDS (OUTPATIENT)
Dept: OTHER | Age: 79
End: 2019-12-31

## 2020-03-03 PROBLEM — I50.9 HEART FAILURE, UNSPECIFIED HF CHRONICITY, UNSPECIFIED HEART FAILURE TYPE (HCC): Status: RESOLVED | Noted: 2020-03-03 | Resolved: 2020-03-03

## 2020-03-03 PROBLEM — I50.9 HEART FAILURE, UNSPECIFIED HF CHRONICITY, UNSPECIFIED HEART FAILURE TYPE (HCC): Status: ACTIVE | Noted: 2020-03-03

## 2020-04-30 ENCOUNTER — HOSPITAL ENCOUNTER (OUTPATIENT)
Dept: MRI IMAGING | Age: 80
Discharge: HOME | End: 2020-04-30
Attending: ORTHOPAEDIC SURGERY

## 2020-04-30 DIAGNOSIS — S50.02XA CONTUSION OF LEFT ELBOW: ICD-10-CM

## 2020-04-30 PROCEDURE — 73221 MRI JOINT UPR EXTREM W/O DYE: CPT

## 2020-05-01 ENCOUNTER — HOSPITAL ENCOUNTER (INPATIENT)
Facility: HOSPITAL | Age: 80
LOS: 4 days | Discharge: HOME | DRG: 390 | End: 2020-05-06
Attending: EMERGENCY MEDICINE | Admitting: INTERNAL MEDICINE
Payer: MEDICARE

## 2020-05-01 ENCOUNTER — APPOINTMENT (OUTPATIENT)
Dept: CT IMAGING | Facility: HOSPITAL | Age: 80
DRG: 390 | End: 2020-05-01
Attending: EMERGENCY MEDICINE
Payer: MEDICARE

## 2020-05-01 DIAGNOSIS — K56.609 SMALL BOWEL OBSTRUCTION (HCC): Primary | ICD-10-CM

## 2020-05-01 PROBLEM — R73.9 HYPERGLYCEMIA: Status: ACTIVE | Noted: 2020-05-01

## 2020-05-01 PROBLEM — D72.829 LEUKOCYTOSIS: Status: ACTIVE | Noted: 2020-05-01

## 2020-05-01 PROCEDURE — 96361 HYDRATE IV INFUSION ADD-ON: CPT

## 2020-05-01 PROCEDURE — 81001 URINALYSIS AUTO W/SCOPE: CPT | Performed by: EMERGENCY MEDICINE

## 2020-05-01 PROCEDURE — 85025 COMPLETE CBC W/AUTO DIFF WBC: CPT | Performed by: EMERGENCY MEDICINE

## 2020-05-01 PROCEDURE — 96374 THER/PROPH/DIAG INJ IV PUSH: CPT

## 2020-05-01 PROCEDURE — 96376 TX/PRO/DX INJ SAME DRUG ADON: CPT

## 2020-05-01 PROCEDURE — 80053 COMPREHEN METABOLIC PANEL: CPT | Performed by: EMERGENCY MEDICINE

## 2020-05-01 PROCEDURE — 99285 EMERGENCY DEPT VISIT HI MDM: CPT

## 2020-05-01 PROCEDURE — 93010 ELECTROCARDIOGRAM REPORT: CPT

## 2020-05-01 PROCEDURE — 93005 ELECTROCARDIOGRAM TRACING: CPT

## 2020-05-01 PROCEDURE — 87086 URINE CULTURE/COLONY COUNT: CPT | Performed by: EMERGENCY MEDICINE

## 2020-05-01 PROCEDURE — 74176 CT ABD & PELVIS W/O CONTRAST: CPT | Performed by: EMERGENCY MEDICINE

## 2020-05-01 RX ORDER — DEXTROSE AND SODIUM CHLORIDE 5; .45 G/100ML; G/100ML
INJECTION, SOLUTION INTRAVENOUS ONCE
Status: COMPLETED | OUTPATIENT
Start: 2020-05-01 | End: 2020-05-02

## 2020-05-01 RX ORDER — ONDANSETRON 2 MG/ML
4 INJECTION INTRAMUSCULAR; INTRAVENOUS ONCE
Status: COMPLETED | OUTPATIENT
Start: 2020-05-01 | End: 2020-05-01

## 2020-05-02 ENCOUNTER — APPOINTMENT (OUTPATIENT)
Dept: GENERAL RADIOLOGY | Facility: HOSPITAL | Age: 80
DRG: 390 | End: 2020-05-02
Attending: SURGERY
Payer: MEDICARE

## 2020-05-02 PROCEDURE — 85025 COMPLETE CBC W/AUTO DIFF WBC: CPT | Performed by: INTERNAL MEDICINE

## 2020-05-02 PROCEDURE — 74019 RADEX ABDOMEN 2 VIEWS: CPT | Performed by: SURGERY

## 2020-05-02 PROCEDURE — 80048 BASIC METABOLIC PNL TOTAL CA: CPT | Performed by: INTERNAL MEDICINE

## 2020-05-02 RX ORDER — ACETAMINOPHEN 10 MG/ML
1000 INJECTION, SOLUTION INTRAVENOUS EVERY 6 HOURS PRN
Status: DISCONTINUED | OUTPATIENT
Start: 2020-05-02 | End: 2020-05-06

## 2020-05-02 RX ORDER — ONDANSETRON 2 MG/ML
4 INJECTION INTRAMUSCULAR; INTRAVENOUS EVERY 6 HOURS PRN
Status: DISCONTINUED | OUTPATIENT
Start: 2020-05-02 | End: 2020-05-06

## 2020-05-02 RX ORDER — METOCLOPRAMIDE HYDROCHLORIDE 5 MG/ML
5 INJECTION INTRAMUSCULAR; INTRAVENOUS EVERY 8 HOURS PRN
Status: DISCONTINUED | OUTPATIENT
Start: 2020-05-02 | End: 2020-05-05

## 2020-05-02 RX ORDER — SODIUM CHLORIDE 9 MG/ML
INJECTION, SOLUTION INTRAVENOUS CONTINUOUS
Status: DISCONTINUED | OUTPATIENT
Start: 2020-05-02 | End: 2020-05-06

## 2020-05-02 RX ORDER — MORPHINE SULFATE 4 MG/ML
2 INJECTION, SOLUTION INTRAMUSCULAR; INTRAVENOUS EVERY 2 HOUR PRN
Status: DISCONTINUED | OUTPATIENT
Start: 2020-05-02 | End: 2020-05-06

## 2020-05-02 RX ORDER — MORPHINE SULFATE 4 MG/ML
4 INJECTION, SOLUTION INTRAMUSCULAR; INTRAVENOUS EVERY 2 HOUR PRN
Status: DISCONTINUED | OUTPATIENT
Start: 2020-05-02 | End: 2020-05-06

## 2020-05-02 RX ORDER — MORPHINE SULFATE 4 MG/ML
1 INJECTION, SOLUTION INTRAMUSCULAR; INTRAVENOUS EVERY 2 HOUR PRN
Status: DISCONTINUED | OUTPATIENT
Start: 2020-05-02 | End: 2020-05-06

## 2020-05-02 RX ORDER — HEPARIN SODIUM 5000 [USP'U]/ML
5000 INJECTION, SOLUTION INTRAVENOUS; SUBCUTANEOUS EVERY 8 HOURS SCHEDULED
Status: DISCONTINUED | OUTPATIENT
Start: 2020-05-02 | End: 2020-05-06

## 2020-05-02 NOTE — ED PROVIDER NOTES
Orders addressed   Patient Seen in: [facility] Emergency Department      History   Patient presents with:  Nausea/Vomiting/Diarrhea    Stated Complaint: vomiting    HPI    This is a 82-year-old woman, history of anal cancer status post chemotherapy and x-ray therapy i •      • OTHER SURGICAL HISTORY      10 breast surg - implants - no cancer  -    • OTHER SURGICAL HISTORY      BSO next year   • OTHER SURGICAL HISTORY  2012    left leg muscle surgery   • REDUCTION LEFT     • REDUCTION RIGHT     • Sahra Dooley Cloudy (*)     Ketones Urine Trace (*)     Protein Urine 30  (*)     Leukocyte Esterase Urine Small (*)     WBC Urine 11-20 (*)     Squamous Epi.  Cells Moderate (*)     Hyaline Casts Present (*)     Mucous Urine 3+ (*)     All other components within barbara therefore, the MR exam will be rescheduled (this information is provided for your medical records).     Ct Abdomen+pelvis(cpt=74176)    Result Date: 5/1/2020  PROCEDURE:  CT ABDOMEN+PELVIS (CPT=74176)  COMPARISON:  [facility] , CT, CT ABDOMEN PELVIS IV CONTRAST degenerative disc disease and facet disease L4-5 and L5-S1. Mild degenerative change of the hip joints and SI joints. No acute fracture. LUNG BASES:  No visible pulmonary or pleural disease. OTHER:  Negative. CONCLUSION:  1.  Findings are consistent wi Leukocytosis D72.829 5/1/2020 Yes    Small bowel obstruction ([facility] [address] K56.609 5/1/2020 Unknown

## 2020-05-02 NOTE — PLAN OF CARE
Problem: PAIN - ADULT  Goal: Verbalizes/displays adequate comfort level or patient's stated pain goal  Description  INTERVENTIONS:  - Encourage pt to monitor pain and request assistance  - Assess pain using appropriate pain scale  - Administer analgesics Monitor I&O, WT and lab values  - Obtain nutritional consult as needed  - Optimize oral hygiene and moisture  - Encourage food from home; allow for food preferences  - Enhance eating environment  Outcome: Not Progressing     Problem: GENITOURINARY - ADULT

## 2020-05-02 NOTE — PROGRESS NOTES
Pharmacy Note: Renal dose adjustment for Metoclopramide (Reglan)  Maribel Angela has been prescribed Metoclopramide (Reglan) 10 mg every 8 hours as needed. Estimated Creatinine Clearance: 32.8 mL/min (A) (based on SCr of 1.44 mg/dL (H)).     Her calculate

## 2020-05-02 NOTE — PLAN OF CARE

## 2020-05-02 NOTE — CONSULTS
[facility]  Report of Consultation    Marily Boogie Patient Status:  Inpatient    1940 MRN [id number]   [facility] 7NE-A Attending Lydia Hernandez MD   Hosp Day # 0 PCP Vivi Jackson MD     Reason for Consultation:    Smiley Rust    •      • OTHER SURGICAL HISTORY      10 breast surg - implants - no cancer  -    • OTHER SURGICAL HISTORY      BSO next year   • OTHER SURGICAL HISTORY  2012    left leg muscle surgery   • REDUCTION LEFT     • REDUCTION RIGHT     • **OR** morphINE sulfate (PF) 4 MG/ML injection 4 mg, 4 mg, Intravenous, Q2H PRN  •  ondansetron HCl (ZOFRAN) injection 4 mg, 4 mg, Intravenous, Q6H PRN  •  Metoclopramide HCl (REGLAN) injection 5 mg, 5 mg, Intravenous, Q8H PRN  •  acetaminophen (OFIRMEV) i anicteric    HEENT:   normocephalic    NECK: supple, no JVD    RESPIRATORY: clear to auscultation    CARDIOVASCULAR: RRR    ABDOMEN: Distended and diffusely tender. No guarding or peritoneal findings.     LYMPHATIC: no lymphadenopathy    EXTREMITIES: no cy Radiology Data Registry) which includes the Dose Index Registry. PATIENT STATED HISTORY: (As transcribed by Technologist)  Patient complains of vomiting that started this early morning.     FINDINGS:  LIVER:  No enlargement, atrophy, abnormal density, or s 8:58 PM             Problem List:    Patient Active Problem List:     History of rectal or anal cancer     History of iron deficiency anemia     Fam hx-ischem heart disease     Atherosclerosis of aorta (HCC)     Vitamin D deficiency     Centrilobular emphy

## 2020-05-02 NOTE — H&P
[facility] hospitalist H+P  PCP Aurelia Doyle MD  CC abdominal pain, emesis    HPI 71 yo female with multiple medical problems including but not limited to anal cancer,  pulmonary emphysema,  presented with abdominal pain, vomiting. No fever, no COVID contacts.  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 , Disp: 1 each, Rfl: 3  aspirin  MG Oral Tab EC, Take 1 tablet (325 mg total) by mouth 2 (two) times daily. , Disp: 60 table

## 2020-05-02 NOTE — PROGRESS NOTES
05/02/20 1148   Clinical Encounter Type   Visited With Health care provider  (RN Heather Thompson)   Continue Visiting Yes   Patient's Supportive Strategies/Resources 1:1 interaction with pt.'s RN   Attempted visit; patient not available.   remains available

## 2020-05-03 ENCOUNTER — APPOINTMENT (OUTPATIENT)
Dept: GENERAL RADIOLOGY | Facility: HOSPITAL | Age: 80
DRG: 390 | End: 2020-05-03
Attending: SURGERY
Payer: MEDICARE

## 2020-05-03 PROCEDURE — S0028 INJECTION, FAMOTIDINE, 20 MG: HCPCS | Performed by: HOSPITALIST

## 2020-05-03 PROCEDURE — 85027 COMPLETE CBC AUTOMATED: CPT | Performed by: SURGERY

## 2020-05-03 PROCEDURE — 74019 RADEX ABDOMEN 2 VIEWS: CPT | Performed by: SURGERY

## 2020-05-03 RX ORDER — FAMOTIDINE 10 MG/ML
20 INJECTION, SOLUTION INTRAVENOUS 2 TIMES DAILY
Status: DISCONTINUED | OUTPATIENT
Start: 2020-05-03 | End: 2020-05-06

## 2020-05-03 NOTE — PROGRESS NOTES
[facility] hospitalist daily note  Patient was seen/examined on 5/3/20    S; no chest pain, no SOB, still has abd pain, not passing gas, + nausea but no emesis.  NG in place    Mediation in Epic    PE    05/03/20  1323   BP: 126/53   Pulse: 85   Resp: 15   Temp:

## 2020-05-03 NOTE — PROGRESS NOTES
05/02/20 1929   Clinical Encounter Type   Visited With Patient   Routine Visit Introduction   Patient's Supportive Strategies/Resources  provided emotional support   Mormon Encounters   Mormon Needs Prayer   Patient Spiritual Encounters

## 2020-05-04 ENCOUNTER — APPOINTMENT (OUTPATIENT)
Dept: GENERAL RADIOLOGY | Facility: HOSPITAL | Age: 80
DRG: 390 | End: 2020-05-04
Attending: INTERNAL MEDICINE
Payer: MEDICARE

## 2020-05-04 ENCOUNTER — APPOINTMENT (OUTPATIENT)
Dept: GENERAL RADIOLOGY | Facility: HOSPITAL | Age: 80
DRG: 390 | End: 2020-05-04
Attending: SURGERY
Payer: MEDICARE

## 2020-05-04 PROCEDURE — 71045 X-RAY EXAM CHEST 1 VIEW: CPT | Performed by: INTERNAL MEDICINE

## 2020-05-04 PROCEDURE — 74019 RADEX ABDOMEN 2 VIEWS: CPT | Performed by: SURGERY

## 2020-05-04 PROCEDURE — 85027 COMPLETE CBC AUTOMATED: CPT | Performed by: SURGERY

## 2020-05-04 PROCEDURE — 36410 VNPNXR 3YR/> PHY/QHP DX/THER: CPT

## 2020-05-04 PROCEDURE — 76937 US GUIDE VASCULAR ACCESS: CPT

## 2020-05-04 PROCEDURE — S0028 INJECTION, FAMOTIDINE, 20 MG: HCPCS | Performed by: HOSPITALIST

## 2020-05-04 PROCEDURE — 87493 C DIFF AMPLIFIED PROBE: CPT | Performed by: INTERNAL MEDICINE

## 2020-05-04 NOTE — PLAN OF CARE
Upon assessment, pt is A/O x 4, afebrile, with stable VS. On tele monitoring, NSR. Lungs, CTA, diminished. On RA with O2Sat of 94%, tolerated well. NGT, in placed, draining to clear brown dark green secretions attached to low intermittent suction.  Abdomen appropriate  Outcome: Progressing     Problem: GASTROINTESTINAL - ADULT  Goal: Minimal or absence of nausea and vomiting  Description  INTERVENTIONS:  - Maintain adequate hydration with IV or PO as ordered and tolerated  - Nasogastric tube to low intermitt

## 2020-05-04 NOTE — PROGRESS NOTES
[facility]  Progress Note    Neeta Rucker Patient Status:  Inpatient    1940 MRN [id number]   [facility] 7NE-A Attending Kaykay Rasmussen MD   Hosp Day # 2 PCP Nesha Mccracken MD     Subjective:    Patient had 3 bowel movements since Small bowel obstruction (HCC)          Impression:     Partial small bowel obstruction    Plan:    Appears to be resolving based on her clinical appearance today. Obstructive series pending.   Will await obstructive series results prior to any decision marvin

## 2020-05-04 NOTE — PLAN OF CARE
Upon assessment, VSS, afebrile. Pt rates pain to abdomen 5/10, pt requesting Tylenol IV. Pt refusing narcotics. Abdomen is soft, rounded and tender. Bowel sounds present x4. Pt c/o nausea, Zofran given. NGT to LIS draining dark brown drainage.  Pt c/o hear tolerated  - Nasogastric tube to low intermittent suction as ordered  - Evaluate effectiveness of ordered antiemetic medications  - Provide nonpharmacologic comfort measures as appropriate  - Advance diet as tolerated, if ordered  - Obtain nutritional cons

## 2020-05-04 NOTE — CERTIFICATION
**Certification    PHYSICIAN Certification of Need for Inpatient Hospitalization    Based on the her current state of illness, Jared Britt requires inpatient hospitalization for her SBo.      This requires inpatient medical treatment because the patient's s

## 2020-05-04 NOTE — PROGRESS NOTES
[facility] hospitalist daily note  Patient was seen/examined on 5/4/20     S; no chest pain, no SOB, abd pain is a little better, had 3 small BMs  No emesis.  NG in place     Mediation in Epic     PE    05/04/20  1404   BP: 109/40   Pulse: 77   Resp: 20   Temp: 98

## 2020-05-04 NOTE — PLAN OF CARE
Patient pain controlled with ofirmev. No nausea. NGT became loose overnight, reinserted and obtained xray for placement. NGT draining clear brown output. Patient continues with loose bms, sent sample overnight. Vss. desats overnight, kept on O2.  Safety renée

## 2020-05-05 ENCOUNTER — APPOINTMENT (OUTPATIENT)
Dept: GENERAL RADIOLOGY | Facility: HOSPITAL | Age: 80
DRG: 390 | End: 2020-05-05
Attending: SURGERY
Payer: MEDICARE

## 2020-05-05 PROCEDURE — 74019 RADEX ABDOMEN 2 VIEWS: CPT | Performed by: SURGERY

## 2020-05-05 PROCEDURE — 80048 BASIC METABOLIC PNL TOTAL CA: CPT | Performed by: HOSPITALIST

## 2020-05-05 PROCEDURE — S0028 INJECTION, FAMOTIDINE, 20 MG: HCPCS | Performed by: HOSPITALIST

## 2020-05-05 PROCEDURE — 85025 COMPLETE CBC W/AUTO DIFF WBC: CPT | Performed by: HOSPITALIST

## 2020-05-05 RX ORDER — METOCLOPRAMIDE HYDROCHLORIDE 5 MG/ML
10 INJECTION INTRAMUSCULAR; INTRAVENOUS EVERY 8 HOURS PRN
Status: DISCONTINUED | OUTPATIENT
Start: 2020-05-05 | End: 2020-05-06

## 2020-05-05 NOTE — PROGRESS NOTES
PATIENT IS A&O X 4. SHE IS ON A CLEAR LIQUID DIET. SHE HAD NG TUBE REMOVED AFTER 6HR CLAMPING TRIAL. SHE IS ON LEFT ARM PRECAUTIONS DUE TO LEFT BICEP RUPTURE. SHE HAS IV FLUIDS INFUSING. SHE IN ON TELE RUNNING NSR. SHE IS UP AD RORO.  POTASSIUM REPLACE PER P

## 2020-05-05 NOTE — PROGRESS NOTES
[facility]  Progress Note    Nathalie Mccoy Patient Status:  Inpatient    1940 MRN [id number]   [facility] 7NE-A Attending Paresh Laguna MD   Hosp Day # 3 PCP Gilberto Garner MD     Subjective:    Patient is feeling better.   Multipl joint of right hand     Primary osteoarthritis of left knee     Leukocytosis     Hyperglycemia     Small bowel obstruction (HCC)          Impression:     Partial small bowel obstruction    Plan:    Overall clinically improved with return of GI function.   Trudy Phalen

## 2020-05-05 NOTE — PLAN OF CARE
Pt alert and orientatedx4. Room air. Pulse Ox. Tele- NSR. Left arm bicep bruising. Upper and lower dentures. SCDs. NPO. Bowel sounds active. Abdomen soft. Pt has had 4 bowel movements this far into the shift. Bowel movements have been loose. Passing gas.  V adequate hydration with IV or PO as ordered and tolerated  - Nasogastric tube to low intermittent suction as ordered  - Evaluate effectiveness of ordered antiemetic medications  - Provide nonpharmacologic comfort measures as appropriate  - Advance diet as

## 2020-05-05 NOTE — PROGRESS NOTES
[facility] Pharmacy Note:  Renal Dose Adjustment for Metoclopramide (REGLAN)    Debbie Del Rio was originally prescribed Reglan every 8 hours as needed which was renally dose adjusted at the time of the original order per P&T approved renal dosing protocol.   Renal

## 2020-05-05 NOTE — PROGRESS NOTES
[facility] hospitalist daily note  Patient was seen/examined on 5/5/20     S; no chest pain, no SOB, abd pain is a little better, had  BMs   No emesis.  NG in place     Mediation in Epic     PE    05/05/20  1236   BP: 124/76   Pulse: 71   Resp: 21   Temp: 97.8 °F

## 2020-05-06 VITALS
OXYGEN SATURATION: 97 % | TEMPERATURE: 98 F | WEIGHT: 143.38 LBS | HEART RATE: 105 BPM | RESPIRATION RATE: 18 BRPM | BODY MASS INDEX: 28.91 KG/M2 | SYSTOLIC BLOOD PRESSURE: 149 MMHG | DIASTOLIC BLOOD PRESSURE: 66 MMHG | HEIGHT: 59 IN

## 2020-05-06 PROCEDURE — S0028 INJECTION, FAMOTIDINE, 20 MG: HCPCS | Performed by: HOSPITALIST

## 2020-05-06 PROCEDURE — 83735 ASSAY OF MAGNESIUM: CPT | Performed by: HOSPITALIST

## 2020-05-06 PROCEDURE — 84132 ASSAY OF SERUM POTASSIUM: CPT | Performed by: INTERNAL MEDICINE

## 2020-05-06 RX ORDER — LOPERAMIDE HYDROCHLORIDE 2 MG/1
2 CAPSULE ORAL 4 TIMES DAILY PRN
Status: DISCONTINUED | OUTPATIENT
Start: 2020-05-06 | End: 2020-05-06

## 2020-05-06 RX ORDER — POTASSIUM CHLORIDE 20 MEQ/1
40 TABLET, EXTENDED RELEASE ORAL EVERY 4 HOURS
Status: COMPLETED | OUTPATIENT
Start: 2020-05-06 | End: 2020-05-06

## 2020-05-06 NOTE — PLAN OF CARE
Patient is A/Ox4. RA, . VSS. Tele- NSR/ST. Refusing SCDs. Voids. Clear liquid diet tolerated. Denies N/V. Pain controlled with IV tylenol. Up ad magali. Per patient x5 loose Bms overnight. L arm precaution. IVF infusing in right forearm.  Slight redness to vomiting  Description  INTERVENTIONS:  - Maintain adequate hydration with IV or PO as ordered and tolerated  - Nasogastric tube to low intermittent suction as ordered  - Evaluate effectiveness of ordered antiemetic medications  - Provide nonpharmacologic c

## 2020-05-06 NOTE — PLAN OF CARE
Problem: Patient/Family Goals  Goal: Patient/Family Long Term Goal  Description  Patient's Long Term Goal: DISCHARGE    Interventions:  - RETURN TO REGULAR ADL'S  -TOLERATE ADVANCED DIET  -AMBULATE AROUND THE KAMARA 3 TIMES A DAY.  - See additional Care Pl function  Description  INTERVENTIONS:  - Assess bowel function  - Maintain adequate hydration with IV or PO as ordered and tolerated  - Evaluate effectiveness of GI medications  - Encourage mobilization and activity  - Obtain nutritional consult as needed

## 2020-05-06 NOTE — DIETARY NOTE
[facility]     Admitting diagnosis:  Small bowel obstruction ([address] [I57.877]    Ht: 149.9 cm (4' 11\")  Wt: 65 kg (143 lb 6.4 oz). This is 146 % of IBW  Body mass index is 28.96 kg/m².   IBW: 44.5 kg    Labs/Meds r

## 2020-05-06 NOTE — PROGRESS NOTES
[facility] hospitalist daily note  Patient was seen/examined on 5/6/20     S; no chest pain, no SOB, per pt she is feeling better, passing gas and tolerating liquid diet  + loose stool       Mediation in Epic     PE .   05/06/20  1240   BP: 109/74   Pulse: 87   R

## 2020-05-06 NOTE — PROGRESS NOTES
[facility]  Progress Note    Nathalie Mccoy Patient Status:  Inpatient    1940 MRN [id number]   [facility] 7NE-A Attending aPresh Laguna MD   Hosp Day # 4 PCP Gilberto Garner MD     Subjective: Tolerating clear liquids.   Positive Follow-up in 1 week with telephone visit.       Joann Bernardo  5/6/2020

## 2020-05-06 NOTE — PROGRESS NOTES
NURSING DISCHARGE NOTE    Discharged Home via Wheelchair. Accompanied by Support staff  Belongings Taken by patient/family. PATIENT D/C IN STABLE CONDITION VIA WHEELCHAIR. REVIEWED D/C INSTRUCTIONS WITH PT, SHE HAD NO FURTHER QUESTIONS.  INSTRUCTED

## 2020-05-11 PROBLEM — D72.829 LEUKOCYTOSIS: Status: RESOLVED | Noted: 2020-05-01 | Resolved: 2020-05-11

## 2020-05-11 PROBLEM — R73.9 HYPERGLYCEMIA: Status: RESOLVED | Noted: 2020-05-01 | Resolved: 2020-05-11

## 2020-05-18 ENCOUNTER — APPOINTMENT (OUTPATIENT)
Dept: MRI IMAGING | Age: 80
End: 2020-05-18
Attending: ORTHOPAEDIC SURGERY

## 2020-05-18 NOTE — DISCHARGE SUMMARY
[facility] Internal Medicine Discharge Summary   Patient ID:  Sherwin Franco  [id number]  70year old  8/13/1940    Admit date: 5/1/2020    Discharge date and time: 5/6/2020     Attending Physician: No att. providers found     Primary Care Physician: Ramirez Minor, ordered     Discharge when ok with surgery    Patient instructed to seek medical attention if symptoms return, do not improve or get worse.  Pt expressed understanding       Day of discharge Exam   no chest pain, no SOB, per pt she is feeling better, passin

## 2020-06-01 ENCOUNTER — HOSPITAL ENCOUNTER (OUTPATIENT)
Dept: GENERAL RADIOLOGY | Age: 80
Discharge: HOME | End: 2020-06-01
Attending: INTERNAL MEDICINE

## 2020-06-01 DIAGNOSIS — K56.609 SBO (SMALL BOWEL OBSTRUCTION) (CMD): ICD-10-CM

## 2020-06-01 PROCEDURE — 74250 X-RAY XM SM INT 1CNTRST STD: CPT

## 2020-08-09 ENCOUNTER — HOSPITAL ENCOUNTER (EMERGENCY)
Facility: HOSPITAL | Age: 80
Discharge: HOME | End: 2020-08-09
Attending: EMERGENCY MEDICINE
Payer: MEDICARE

## 2020-08-09 ENCOUNTER — APPOINTMENT (OUTPATIENT)
Dept: GENERAL RADIOLOGY | Facility: HOSPITAL | Age: 80
End: 2020-08-09
Attending: EMERGENCY MEDICINE
Payer: MEDICARE

## 2020-08-09 VITALS
HEART RATE: 96 BPM | DIASTOLIC BLOOD PRESSURE: 78 MMHG | OXYGEN SATURATION: 99 % | HEIGHT: 59 IN | SYSTOLIC BLOOD PRESSURE: 154 MMHG | WEIGHT: 140 LBS | BODY MASS INDEX: 28.22 KG/M2 | TEMPERATURE: 98 F | RESPIRATION RATE: 17 BRPM

## 2020-08-09 DIAGNOSIS — S60.222A CONTUSION OF LEFT HAND, INITIAL ENCOUNTER: Primary | ICD-10-CM

## 2020-08-09 PROCEDURE — 73130 X-RAY EXAM OF HAND: CPT | Performed by: EMERGENCY MEDICINE

## 2020-08-09 PROCEDURE — 99283 EMERGENCY DEPT VISIT LOW MDM: CPT

## 2020-08-09 NOTE — ED PROVIDER NOTES
Patient Seen in: [facility] Emergency Department      History   Patient presents with:  Fall    Stated Complaint: Fall last night with dog, Denies hitting head, L hand pain     HPI    51-year-old female comes to the hospital complaint of having pain REDUCTION RIGHT  1970's   • THYROIDECTOMY  02/24/2019    partial    • THYROIDECTOMY Left 2/26/2019    Performed by Sofia Ortiz MD at [facility] MAIN OR                    Social History    Tobacco Use      Smoking status: Former Smoker        Packs/day: 1.00 PATIENT STATED HISTORY: (As transcribed by Technologist)  Patient fell last night while walking her puppy. She has pain, swelling and bruising in her left hand on the 2nd through 4th fingers. FINDINGS:  Scattered mild DJD. Bony demineralization.   Ne

## 2020-08-09 NOTE — ED INITIAL ASSESSMENT (HPI)
Patient presents for evaluation of injury to left hand/digits. She reports she was walking her dog when the dog pulled and she fell backward, bracing her fall with her left hand. Denies injury to wrist or other injuries.

## 2020-09-02 ENCOUNTER — APPOINTMENT (OUTPATIENT)
Dept: GENERAL RADIOLOGY | Facility: HOSPITAL | Age: 80
End: 2020-09-02
Attending: EMERGENCY MEDICINE
Payer: MEDICARE

## 2020-09-02 ENCOUNTER — HOSPITAL ENCOUNTER (OUTPATIENT)
Facility: HOSPITAL | Age: 80
Setting detail: OBSERVATION
Discharge: HOME | End: 2020-09-05
Attending: EMERGENCY MEDICINE | Admitting: HOSPITALIST
Payer: MEDICARE

## 2020-09-02 DIAGNOSIS — S32.591A PUBIC RAMUS FRACTURE, RIGHT, CLOSED, INITIAL ENCOUNTER (HCC): Primary | ICD-10-CM

## 2020-09-02 PROBLEM — D72.829 LEUKOCYTOSIS: Status: ACTIVE | Noted: 2020-09-02

## 2020-09-02 LAB
ALBUMIN SERPL-MCNC: 3.5 G/DL (ref 3.4–5)
ALBUMIN/GLOB SERPL: 0.9 {RATIO} (ref 1–2)
ALP LIVER SERPL-CCNC: 96 U/L (ref 55–142)
ALT SERPL-CCNC: 21 U/L (ref 13–56)
ANION GAP SERPL CALC-SCNC: 4 MMOL/L (ref 0–18)
AST SERPL-CCNC: 20 U/L (ref 15–37)
BASOPHILS # BLD AUTO: 0.06 X10(3) UL (ref 0–0.2)
BASOPHILS NFR BLD AUTO: 0.4 %
BILIRUB SERPL-MCNC: 0.4 MG/DL (ref 0.1–2)
BUN BLD-MCNC: 16 MG/DL (ref 7–18)
BUN/CREAT SERPL: 19 (ref 10–20)
CALCIUM BLD-MCNC: 9 MG/DL (ref 8.5–10.1)
CHLORIDE SERPL-SCNC: 107 MMOL/L (ref 98–112)
CO2 SERPL-SCNC: 29 MMOL/L (ref 21–32)
CREAT BLD-MCNC: 0.84 MG/DL (ref 0.55–1.02)
DEPRECATED RDW RBC AUTO: 41.5 FL (ref 35.1–46.3)
EOSINOPHIL # BLD AUTO: 0.1 X10(3) UL (ref 0–0.7)
EOSINOPHIL NFR BLD AUTO: 0.7 %
ERYTHROCYTE [DISTWIDTH] IN BLOOD BY AUTOMATED COUNT: 13.1 % (ref 11–15)
GLOBULIN PLAS-MCNC: 3.7 G/DL (ref 2.8–4.4)
GLUCOSE BLD-MCNC: 98 MG/DL (ref 70–99)
HCT VFR BLD AUTO: 43.8 % (ref 35–48)
HGB BLD-MCNC: 14.3 G/DL (ref 12–16)
IMM GRANULOCYTES # BLD AUTO: 0.05 X10(3) UL (ref 0–1)
IMM GRANULOCYTES NFR BLD: 0.3 %
LYMPHOCYTES # BLD AUTO: 1.18 X10(3) UL (ref 1–4)
LYMPHOCYTES NFR BLD AUTO: 8.2 %
M PROTEIN MFR SERPL ELPH: 7.2 G/DL (ref 6.4–8.2)
MCH RBC QN AUTO: 28.5 PG (ref 26–34)
MCHC RBC AUTO-ENTMCNC: 32.6 G/DL (ref 31–37)
MCV RBC AUTO: 87.4 FL (ref 80–100)
MONOCYTES # BLD AUTO: 0.64 X10(3) UL (ref 0.1–1)
MONOCYTES NFR BLD AUTO: 4.4 %
NEUTROPHILS # BLD AUTO: 12.37 X10 (3) UL (ref 1.5–7.7)
NEUTROPHILS # BLD AUTO: 12.37 X10(3) UL (ref 1.5–7.7)
NEUTROPHILS NFR BLD AUTO: 86 %
OSMOLALITY SERPL CALC.SUM OF ELEC: 291 MOSM/KG (ref 275–295)
PLATELET # BLD AUTO: 230 10(3)UL (ref 150–450)
POTASSIUM SERPL-SCNC: 3.7 MMOL/L (ref 3.5–5.1)
RBC # BLD AUTO: 5.01 X10(6)UL (ref 3.8–5.3)
SODIUM SERPL-SCNC: 140 MMOL/L (ref 136–145)
WBC # BLD AUTO: 14.4 X10(3) UL (ref 4–11)

## 2020-09-02 PROCEDURE — 99285 EMERGENCY DEPT VISIT HI MDM: CPT

## 2020-09-02 PROCEDURE — 36415 COLL VENOUS BLD VENIPUNCTURE: CPT

## 2020-09-02 PROCEDURE — 72220 X-RAY EXAM SACRUM TAILBONE: CPT | Performed by: EMERGENCY MEDICINE

## 2020-09-02 PROCEDURE — 85025 COMPLETE CBC W/AUTO DIFF WBC: CPT | Performed by: EMERGENCY MEDICINE

## 2020-09-02 PROCEDURE — 73502 X-RAY EXAM HIP UNI 2-3 VIEWS: CPT | Performed by: EMERGENCY MEDICINE

## 2020-09-02 PROCEDURE — 80053 COMPREHEN METABOLIC PANEL: CPT | Performed by: EMERGENCY MEDICINE

## 2020-09-02 PROCEDURE — 71045 X-RAY EXAM CHEST 1 VIEW: CPT | Performed by: EMERGENCY MEDICINE

## 2020-09-02 RX ORDER — ACETAMINOPHEN 500 MG
1000 TABLET ORAL ONCE
Status: COMPLETED | OUTPATIENT
Start: 2020-09-02 | End: 2020-09-02

## 2020-09-02 RX ORDER — IBUPROFEN 600 MG/1
600 TABLET ORAL ONCE
Status: COMPLETED | OUTPATIENT
Start: 2020-09-02 | End: 2020-09-02

## 2020-09-03 LAB
BILIRUB UR QL STRIP.AUTO: NEGATIVE
COLOR UR AUTO: YELLOW
GLUCOSE UR STRIP.AUTO-MCNC: NEGATIVE MG/DL
KETONES UR STRIP.AUTO-MCNC: NEGATIVE MG/DL
NITRITE UR QL STRIP.AUTO: NEGATIVE
PH UR STRIP.AUTO: 5 [PH] (ref 4.5–8)
PROT UR STRIP.AUTO-MCNC: NEGATIVE MG/DL
RBC UR QL AUTO: NEGATIVE
SARS-COV-2 RNA RESP QL NAA+PROBE: NOT DETECTED
SP GR UR STRIP.AUTO: 1.03 (ref 1–1.03)
UROBILINOGEN UR STRIP.AUTO-MCNC: <2 MG/DL

## 2020-09-03 PROCEDURE — 97116 GAIT TRAINING THERAPY: CPT

## 2020-09-03 PROCEDURE — 87086 URINE CULTURE/COLONY COUNT: CPT | Performed by: HOSPITALIST

## 2020-09-03 PROCEDURE — 81001 URINALYSIS AUTO W/SCOPE: CPT | Performed by: HOSPITALIST

## 2020-09-03 PROCEDURE — 97530 THERAPEUTIC ACTIVITIES: CPT

## 2020-09-03 PROCEDURE — 97161 PT EVAL LOW COMPLEX 20 MIN: CPT

## 2020-09-03 PROCEDURE — 96372 THER/PROPH/DIAG INJ SC/IM: CPT

## 2020-09-03 RX ORDER — KETOROLAC TROMETHAMINE 15 MG/ML
15 INJECTION, SOLUTION INTRAMUSCULAR; INTRAVENOUS EVERY 6 HOURS PRN
Status: DISCONTINUED | OUTPATIENT
Start: 2020-09-03 | End: 2020-09-03

## 2020-09-03 RX ORDER — HEPARIN SODIUM 5000 [USP'U]/ML
5000 INJECTION, SOLUTION INTRAVENOUS; SUBCUTANEOUS EVERY 8 HOURS SCHEDULED
Status: DISCONTINUED | OUTPATIENT
Start: 2020-09-03 | End: 2020-09-05

## 2020-09-03 RX ORDER — HYDROMORPHONE HYDROCHLORIDE 1 MG/ML
0.5 INJECTION, SOLUTION INTRAMUSCULAR; INTRAVENOUS; SUBCUTANEOUS EVERY 30 MIN PRN
Status: DISPENSED | OUTPATIENT
Start: 2020-09-03 | End: 2020-09-03

## 2020-09-03 RX ORDER — TRAMADOL HYDROCHLORIDE 50 MG/1
50 TABLET ORAL EVERY 6 HOURS PRN
Status: DISCONTINUED | OUTPATIENT
Start: 2020-09-03 | End: 2020-09-05

## 2020-09-03 RX ORDER — ONDANSETRON 2 MG/ML
4 INJECTION INTRAMUSCULAR; INTRAVENOUS EVERY 4 HOURS PRN
Status: DISCONTINUED | OUTPATIENT
Start: 2020-09-03 | End: 2020-09-05

## 2020-09-03 RX ORDER — ACETAMINOPHEN 325 MG/1
650 TABLET ORAL EVERY 6 HOURS PRN
Status: DISCONTINUED | OUTPATIENT
Start: 2020-09-03 | End: 2020-09-05

## 2020-09-03 RX ORDER — ALBUTEROL SULFATE 90 UG/1
2 AEROSOL, METERED RESPIRATORY (INHALATION) EVERY 6 HOURS PRN
Status: DISCONTINUED | OUTPATIENT
Start: 2020-09-03 | End: 2020-09-05

## 2020-09-03 NOTE — PHYSICAL THERAPY NOTE
PT order received and chart reviewed. Pt is here after a fall and incurred a :  Acute comminuted fracture involving the right superior pubic ramus with nondisplaced fracture of the right inferior pubic ramus.  Will await for ortho's recommendation for WB s

## 2020-09-03 NOTE — PLAN OF CARE
NURSING ADMISSION NOTE      Patient admitted via Cart  Oriented to room. Safety precautions initiated. Bed in low position. Call light in reach.   Received from ER ,alert and oriented ,assisted to bed and made comfortable ,severe pain when moved in b

## 2020-09-03 NOTE — H&P
[facility] Hospitalist H&P       CC: Patient presents with:  Fall  Trauma       PCP: Kandice James MD    History of Present Illness: Patient is a 68year old female with PMH sig for anal cancer, emphysema, A fib on ASA and depression is now admitted after a mec THYROIDECTOMY Left 2/26/2019    Performed by Aneta Pineda MD at [facility] MAIN OR        ALL:    Statins                 OTHER (SEE COMMENTS)    Comment:\"Weakness\"  Augmentin [Amoclan]     PAIN    Comment:Stomach pain / indigestion  Cefdinir                D breath, syncope.        OBJECTIVE:  /61 (BP Location: Left arm)   Pulse 87   Temp 97.7 °F (36.5 °C) (Oral)   Resp 18   Ht 152.4 cm (5')   Wt 141 lb (64 kg)   LMP  (LMP Unknown)   SpO2 93%   BMI 27.54 kg/m²   General:  Alert, no distress, appears state pubic ramus and likely nondisplaced fracture of the right inferior pubic ramus. The SI joints are intact and symmetric. SOFT TISSUES:  Negative. No visible soft tissue swelling. EFFUSION:   None visible. OTHER:  Negative. CONCLUSION:   1.   Acu Finalized by (CST): Ghulam Miller MD on 9/02/2020 at 9:18 PM       Xr Hip W Or Wo Pelvis 2 Or 3 Views, Right (cpt=73502)    Result Date: 9/2/2020  PROCEDURE:  XR HIP W OR WO PELVIS 2 OR 3 VIEWS, RIGHT ( CPT=73502)  TECHNIQUE:  Unilateral 2 to 3 views of th reviewed confirming patient's medical history and medications. Further recommendations pending patient's clinical course.   [facility] hospitalist to continue to follow patient while in house    Patient and/or patient's family given opportunity to ask question

## 2020-09-03 NOTE — HOME CARE LIAISON
MET WITH PTNT AND OFFERED CHOICE  OF AGENCIES. PTNT AGREEABLE TO [facility]. MET WITH PTNT TO DISCUSS HOME HEALTH SERVICES AND COVERAGE CRITERIA. PTNT AGREEABLE TO [facility]. PTNT GIVEN RESIDENTIAL BROCHURE.  RESIDENTIAL WITH PROVIDE SN/PT ON DISC

## 2020-09-03 NOTE — ED INITIAL ASSESSMENT (HPI)
Pt presents to ed c/o right buttock pain at a 6/10 after falling at approx 1800 this evening. Pt states mechanical fall backward, denies loc, denies head or neck injury.  Pt is a&ox4

## 2020-09-03 NOTE — ED PROVIDER NOTES
Patient Seen in: [facility] Emergency Department      History   Patient presents with:  Fall  Trauma    Stated Complaint: right buttock pain after falling    HPI    24-year-old female who presented to the emergency department complaining of having r 1970's   • REDUCTION RIGHT  1970's   • THYROIDECTOMY  02/24/2019    partial    • THYROIDECTOMY Left 2/26/2019    Performed by Abran Poole MD at [facility] MAIN OR                    Social History    Tobacco Use      Smoking status: Former Smoker        Packs/ lower extremity. No rotation. Limited flexion at the hip secondary to pain. There is some mild discomfort in palpation along the right pelvis. No open wounds. Pulses are +2 in the dorsalis pedis pulse. +2 femoral artery pulses.        ED Course     La mm.   SOFT TISSUES:  Negative. No visible soft tissue swelling. EFFUSION:  None visible.   OTHER:  Negative.                   =====  CONCLUSION:  Acute comminuted fracture involving the right superior pubic ramus with nondisplaced fracture of the right i Metabolic panel was normal.  The patient is to receive Tylenol and Motrin for pain control. She attempted to ambulate but had difficulty doing so.   The patient inability ambulate with her superior and inferior pubic ramus fractures require pain medication

## 2020-09-03 NOTE — CM/SW NOTE
09/03/20 1500   CM/SW Referral Data   Referral Source Social Work (self-referral)   Reason for Referral Discharge planning;Psychoscial assessment   Informant Patient   Patient Info   Patient's Mental Status Alert;Oriented   Patient's Home Environment Co

## 2020-09-03 NOTE — PHYSICAL THERAPY NOTE
PHYSICAL THERAPY HIP EVALUATION - INPATIENT     Room Number: 384/384-A  Evaluation Date: 9/3/2020  Type of Evaluation: Initial  Physician Order: PT Eval and Treat    Presenting Problem: Acute communited fracture R superior ramus with nondisplaced fracture HISTORY      BSO next year   • OTHER SURGICAL HISTORY  2012    left leg muscle surgery   • REDUCTION LEFT  1970's   • REDUCTION RIGHT  1970's   • THYROIDECTOMY  02/24/2019    partial    • THYROIDECTOMY Left 2/26/2019    Performed by Swapnil Gazra MD at E steps with a railing?: A Lot       AM-PAC Score:  Raw Score: 17   Approx Degree of Impairment: 50.57%   Standardized Score (AM-PAC Scale): 42.13   CMS Modifier (G-Code): CK    FUNCTIONAL ABILITY STATUS  Gait Assessment   Gait Assistance: Minimum assistance clinical presentation is stable and overall the evaluation complexity is considered low. These impairments and comorbidities manifest themselves as functional limitations in independent bed mobility, transfers, and gait.   The patient is below baseline and

## 2020-09-04 PROCEDURE — 97530 THERAPEUTIC ACTIVITIES: CPT

## 2020-09-04 PROCEDURE — 96372 THER/PROPH/DIAG INJ SC/IM: CPT

## 2020-09-04 PROCEDURE — 97116 GAIT TRAINING THERAPY: CPT

## 2020-09-04 RX ORDER — ACETAMINOPHEN 325 MG/1
650 TABLET ORAL EVERY 6 HOURS PRN
Qty: 20 TABLET | Refills: 0 | Status: SHIPPED | COMMUNITY
Start: 2020-09-04 | End: 2020-10-20

## 2020-09-04 NOTE — PROGRESS NOTES
09/04/20 0957   Clinical Encounter Type   Visited With Patient; Health care provider  (SANJAY Ramos)   Routine Visit   (Responded to request for consult)   Patient's Supportive Strategies/Resources  provided spiritual support and patient shared h

## 2020-09-04 NOTE — PLAN OF CARE
Assumed care for this pt at 1. Pt vss, pt states pain is managed by prn tylenol. Pt denies numbness or tingling, can move all extremities.   Will continue to monitor

## 2020-09-04 NOTE — PLAN OF CARE
Problem: Patient/Family Goals  Goal: Patient/Family Long Term Goal  Description  Patient's Long Term Goal: Go home     Interventions:  -   - See additional Care Plan goals for specific interventions   Outcome: Progressing  Goal: Patient/Family Short Term

## 2020-09-04 NOTE — PLAN OF CARE
Right buttocks pain controlled with tylenol po. Pt ambulated with physical therapy WBAT with min assist and walker. Voiding per bedside commode, had bm this shift. Dr. Mary Kay Levy notified of Ortho consult, and will see pt.  Physical Therapy recommends discharge

## 2020-09-04 NOTE — PROGRESS NOTES
Verified with Dr Reyes Hand when will he be by to see the patient as she is ready for DC today. He said he will be seeing her this afternoon.

## 2020-09-04 NOTE — CONSULTS
[facility]    Report of Consultation    China Adri Patient Status:  Observation    1940 MRN [id number]   [facility] 3SW-A Attending Blank De Leon, DO   Hosp Day # 0 PCP Gia Benson MD     Date of Admission:  2020  Date [facility] MAIN OR   • [place] Right [place]   • HYSTERECTOMY  1985    for fibroids   • IMPLANT LEFT     • IMPLANT RIGHT     •      • OTHER SURGICAL HISTORY      10 breast surg - implants - no cancer  -    • OTHER SURGICAL HISTOR UT) Oral Tab, Take by mouth. hydrocortisone 2.5 % External Cream, Use on AA BID  then taper to prn  aspirin  MG Oral Tab EC, Take 1 tablet (325 mg total) by mouth 2 (two) times daily.   ketoconazole 2 % External Cream, Use on AA BID then taper to prn Symmetric palpable pulses distally unless indicated below  Lymph:  No palpable lymph nodes on examined extremities    Right lower Extremity  Appearance: Scattered ecchymosis over the right buttock and lateral hip.   Skin otherwise intact  Tenderness: Tender 9/02/2020 at 9:17 PM     Finalized by (CST): Conchita Lopez MD on 9/02/2020 at 9:18 PM       Xr Hip W Or Wo Pelvis 2 Or 3 Views, Right (cpt=73502)    Result Date: 9/2/2020  CONCLUSION:  Acute comminuted fracture involving the right superior pubic ramus with

## 2020-09-04 NOTE — CM/SW NOTE
PT recommending [facility]. Referral made to Chema Gonzalez with [facility] AT [facility] who met with pt and confirmed plan for [facility]". / to remain available for support and/or discharge planning.      Eric Villarreal LCSW  Discharge Planner  143

## 2020-09-04 NOTE — PLAN OF CARE
Pt a/o x4. VS WNL. Pt has been educated and instructed on pain medication benefits vs. Risks, pt requested pain medication to control 10/10 pain, Tramadol given. Pt requires mod. Assist with RW to bathroom/bedside commode. Pt cont.  To have trouble moving R

## 2020-09-04 NOTE — DISCHARGE SUMMARY
General Medicine Discharge Summary     Patient ID:  Tere Bullard  68year old  8/13/1940    Admit date: 9/2/2020    Discharge date and time: 9/4/20    Attending Physician: DO Cristal Heath Ca THERAPY  IP CONSULT TO SPIRITUAL CARE  IP CONSULT TO SOCIAL WORK    Radiology reports:    Xr Sacrum + Coccyx (min 2 Views) (cpt=72220)    Result Date: 9/2/2020  PROCEDURE:  XR SACRUM + COCCYX (MIN 2 VIEWS) (CPT=72220)  INDICATIONS:  right buttock pain afte PORTABLE  (CPT=71045)  TECHNIQUE:  AP chest radiograph was obtained.   COMPARISON:  [facility] , XR, XR CHEST AP PORTABLE  (CPT=71045), 5/04/2020, 5:01 AM.  INDICATIONS:  right buttock pain after falling  PATIENT STATED HISTORY: (As transcribed by Technologist) (2000 UT) Oral Tab  Take by mouth. hydrocortisone 2.5 % External Cream  Use on AA BID  then taper to prn    aspirin  MG Oral Tab EC  Take 1 tablet (325 mg total) by mouth 2 (two) times daily.     ketoconazole 2 % External Cream  Use on AA BID then

## 2020-09-04 NOTE — PHYSICAL THERAPY NOTE
PHYSICAL THERAPY TREATMENT NOTE - INPATIENT    Room Number: 384/384-A     Session: 1  Number of Visits to Meet Established Goals: 5    Presenting Problem: Acute communited fracture R superior ramus with nondisplaced fracture R inferior ramus       History 1970's   • REDUCTION RIGHT  1970's   • THYROIDECTOMY  02/24/2019    partial    • THYROIDECTOMY Left 2/26/2019    Performed by Elvira Joiner MD at [facility] 214  Too much pain , my daughter will be able to help me at home.  I dont take pain m and overall safety    Skilled Therapy Provided: In bed and stated that she just got back to bed and is having a lot of pain but wanted to use the restroom.  Pt is only taking tylenol discussed with pt the importance of good pain management for make her func home      DISCHARGE RECOMMENDATIONS  PT Discharge Recommendations: 24 hour care/supervision;Home with home health PT     PLAN  PT Treatment Plan: Bed mobility; Body mechanics; Endurance; Energy conservation;Patient education;Gait training;Range of motion;Stre

## 2020-09-05 VITALS
DIASTOLIC BLOOD PRESSURE: 66 MMHG | HEART RATE: 110 BPM | OXYGEN SATURATION: 93 % | TEMPERATURE: 98 F | HEIGHT: 60 IN | BODY MASS INDEX: 27.68 KG/M2 | WEIGHT: 141 LBS | RESPIRATION RATE: 18 BRPM | SYSTOLIC BLOOD PRESSURE: 111 MMHG

## 2020-09-05 PROCEDURE — 96372 THER/PROPH/DIAG INJ SC/IM: CPT

## 2020-09-05 NOTE — PLAN OF CARE
A/Ox4, RA during day, 2L at HS PRN via NC, , IS, SCD's on, decreased sensation to right leg (chronic), on subq heparin, dentures at bedside, voiding via bedside commode and and bathroom, pain controlled with PO medications PRN, LBM 09/04/20, WBAT, up wi

## 2020-09-05 NOTE — DISCHARGE SUMMARY
General Medicine Discharge Summary     Patient ID:  Marily Boogie  68year old  8/13/1940    Admit date: 9/2/2020    Discharge date and time: 9/5/20    Attending Physician: Karina Nelson DO     Primary Care Physician: Vivi [facility] MD     Reason for admis daily.    Cholecalciferol (VITAMIN D) 50 MCG (2000 UT) Oral Tab  Take by mouth. hydrocortisone 2.5 % External Cream  Use on AA BID  then taper to prn    aspirin  MG Oral Tab EC  Take 1 tablet (325 mg total) by mouth 2 (two) times daily.     Uri Kelly

## 2020-09-05 NOTE — PROGRESS NOTES
General Medicine Discharge Summary     Patient ID:  Rene Ramirez  68year old  8/13/1940    Admit date: 9/2/2020    Discharge date and time: 9/4/20    Attending Physician: DO Cristal Rocha Ca CONSULT TO HOSPITALIST  IP CONSULT TO ORTHOPEDIC SURGERY  IP CONSULT TO PHYSICAL THERAPY  IP CONSULT TO SPIRITUAL CARE  IP CONSULT TO SOCIAL WORK    Radiology reports:    Xr Sacrum + Coccyx (min 2 Views) (cpt=72220)    Result Date: 9/2/2020  PROCEDURE:  XR Chest Ap Portable  (cpt=71045)    Result Date: 9/2/2020  PROCEDURE:  XR CHEST AP PORTABLE  (CPT=71045)  TECHNIQUE:  AP chest radiograph was obtained.   COMPARISON:  [facility] , XR, XR CHEST AP PORTABLE  (CPT=71045), 5/04/2020, 5:01 AM.  INDICATIONS:  right but (MULTIVITAL) Oral Tab  Take 1 tablet by mouth daily. Cholecalciferol (VITAMIN D) 50 MCG (2000 UT) Oral Tab  Take by mouth.     hydrocortisone 2.5 % External Cream  Use on AA BID  then taper to prn    aspirin  MG Oral Tab EC  Take 1 tablet (325 mg t

## 2020-09-05 NOTE — PLAN OF CARE
Dr. Gretchen Juarez at bedside, Per Dr. Gretchen Juarez, pt okay to be d/c from ortho standpoint.  Pt preferring to d/c home tomorrow AM when daughter can be available to pick her up

## 2020-09-05 NOTE — CM/SW NOTE
RN states pt was expecting to have a 3 in 1 commode arranged. SW contacted pt, and attempted to discuss if she would like us to order it vs. Getting it on her own.  CHATA explained costs difference, and that it might not be delivered until next week with h

## 2020-09-05 NOTE — CM/SW NOTE
RN states pt prefers to have [facility] order commode. SW spoke with Nino Lopez at [facility], and she will work on ordering commode for pt. RN to obtain order from MD. Nino Lopez also aware pt being discharged today.

## 2020-09-26 ENCOUNTER — HOSPITAL ENCOUNTER (INPATIENT)
Facility: HOSPITAL | Age: 80
LOS: 4 days | Discharge: HOME HEALTH | DRG: 552 | End: 2020-09-30
Attending: EMERGENCY MEDICINE | Admitting: INTERNAL MEDICINE
Payer: MEDICARE

## 2020-09-26 ENCOUNTER — APPOINTMENT (OUTPATIENT)
Dept: CT IMAGING | Facility: HOSPITAL | Age: 80
DRG: 552 | End: 2020-09-26
Attending: EMERGENCY MEDICINE
Payer: MEDICARE

## 2020-09-26 ENCOUNTER — APPOINTMENT (OUTPATIENT)
Dept: MRI IMAGING | Facility: HOSPITAL | Age: 80
DRG: 552 | End: 2020-09-26
Attending: EMERGENCY MEDICINE
Payer: MEDICARE

## 2020-09-26 DIAGNOSIS — R33.9 URINARY RETENTION: ICD-10-CM

## 2020-09-26 DIAGNOSIS — M25.559 PAIN IN JOINT INVOLVING PELVIC REGION AND THIGH, UNSPECIFIED LATERALITY: ICD-10-CM

## 2020-09-26 DIAGNOSIS — S32.009A CLOSED FRACTURE OF TRANSVERSE PROCESS OF LUMBAR VERTEBRA, INITIAL ENCOUNTER (HCC): Primary | ICD-10-CM

## 2020-09-26 DIAGNOSIS — S32.10XA CLOSED FRACTURE OF SACRUM, UNSPECIFIED PORTION OF SACRUM, INITIAL ENCOUNTER (HCC): ICD-10-CM

## 2020-09-26 PROCEDURE — 72192 CT PELVIS W/O DYE: CPT | Performed by: EMERGENCY MEDICINE

## 2020-09-26 PROCEDURE — 76377 3D RENDER W/INTRP POSTPROCES: CPT | Performed by: EMERGENCY MEDICINE

## 2020-09-26 PROCEDURE — 72148 MRI LUMBAR SPINE W/O DYE: CPT | Performed by: EMERGENCY MEDICINE

## 2020-09-26 PROCEDURE — 99223 1ST HOSP IP/OBS HIGH 75: CPT | Performed by: NEUROLOGICAL SURGERY

## 2020-09-26 PROCEDURE — 72131 CT LUMBAR SPINE W/O DYE: CPT | Performed by: EMERGENCY MEDICINE

## 2020-09-26 PROCEDURE — 72195 MRI PELVIS W/O DYE: CPT | Performed by: EMERGENCY MEDICINE

## 2020-09-26 RX ORDER — ASPIRIN 325 MG
325 TABLET, DELAYED RELEASE (ENTERIC COATED) ORAL 2 TIMES DAILY
Status: DISCONTINUED | OUTPATIENT
Start: 2020-09-26 | End: 2020-09-26

## 2020-09-26 RX ORDER — MORPHINE SULFATE 2 MG/ML
2 INJECTION, SOLUTION INTRAMUSCULAR; INTRAVENOUS EVERY 2 HOUR PRN
Status: DISCONTINUED | OUTPATIENT
Start: 2020-09-26 | End: 2020-09-30

## 2020-09-26 RX ORDER — POTASSIUM CHLORIDE 20 MEQ/1
40 TABLET, EXTENDED RELEASE ORAL ONCE
Status: COMPLETED | OUTPATIENT
Start: 2020-09-26 | End: 2020-09-26

## 2020-09-26 RX ORDER — HYDROCODONE BITARTRATE AND ACETAMINOPHEN 5; 325 MG/1; MG/1
1 TABLET ORAL EVERY 4 HOURS PRN
Status: DISCONTINUED | OUTPATIENT
Start: 2020-09-26 | End: 2020-09-30

## 2020-09-26 RX ORDER — HYDROCODONE BITARTRATE AND ACETAMINOPHEN 5; 325 MG/1; MG/1
2 TABLET ORAL ONCE
Status: COMPLETED | OUTPATIENT
Start: 2020-09-26 | End: 2020-09-26

## 2020-09-26 RX ORDER — DOCUSATE SODIUM 100 MG/1
100 CAPSULE, LIQUID FILLED ORAL 2 TIMES DAILY
Status: DISCONTINUED | OUTPATIENT
Start: 2020-09-26 | End: 2020-09-30

## 2020-09-26 RX ORDER — ACETAMINOPHEN 325 MG/1
650 TABLET ORAL EVERY 4 HOURS PRN
Status: DISCONTINUED | OUTPATIENT
Start: 2020-09-26 | End: 2020-09-26

## 2020-09-26 RX ORDER — ONDANSETRON 2 MG/ML
4 INJECTION INTRAMUSCULAR; INTRAVENOUS EVERY 6 HOURS PRN
Status: DISCONTINUED | OUTPATIENT
Start: 2020-09-26 | End: 2020-09-30

## 2020-09-26 RX ORDER — MORPHINE SULFATE 2 MG/ML
1 INJECTION, SOLUTION INTRAMUSCULAR; INTRAVENOUS EVERY 2 HOUR PRN
Status: DISCONTINUED | OUTPATIENT
Start: 2020-09-26 | End: 2020-09-30

## 2020-09-26 RX ORDER — ACETAMINOPHEN 325 MG/1
650 TABLET ORAL EVERY 6 HOURS PRN
Status: DISCONTINUED | OUTPATIENT
Start: 2020-09-26 | End: 2020-09-30

## 2020-09-26 RX ORDER — MORPHINE SULFATE 4 MG/ML
4 INJECTION, SOLUTION INTRAMUSCULAR; INTRAVENOUS EVERY 2 HOUR PRN
Status: DISCONTINUED | OUTPATIENT
Start: 2020-09-26 | End: 2020-09-30

## 2020-09-26 RX ORDER — LORAZEPAM 2 MG/ML
1 INJECTION INTRAMUSCULAR ONCE
Status: COMPLETED | OUTPATIENT
Start: 2020-09-26 | End: 2020-09-26

## 2020-09-26 RX ORDER — HEPARIN SODIUM 5000 [USP'U]/ML
5000 INJECTION, SOLUTION INTRAVENOUS; SUBCUTANEOUS EVERY 12 HOURS SCHEDULED
Status: DISCONTINUED | OUTPATIENT
Start: 2020-09-26 | End: 2020-09-30

## 2020-09-26 RX ORDER — NYSTATIN 100000 U/G
CREAM TOPICAL 2 TIMES DAILY
Status: DISCONTINUED | OUTPATIENT
Start: 2020-09-26 | End: 2020-09-30

## 2020-09-26 RX ORDER — POLYETHYLENE GLYCOL 3350 17 G/17G
17 POWDER, FOR SOLUTION ORAL DAILY PRN
Status: DISCONTINUED | OUTPATIENT
Start: 2020-09-26 | End: 2020-09-30

## 2020-09-26 RX ORDER — HYDROCODONE BITARTRATE AND ACETAMINOPHEN 5; 325 MG/1; MG/1
2 TABLET ORAL EVERY 4 HOURS PRN
Status: DISCONTINUED | OUTPATIENT
Start: 2020-09-26 | End: 2020-09-30

## 2020-09-26 RX ORDER — METOCLOPRAMIDE HYDROCHLORIDE 5 MG/ML
10 INJECTION INTRAMUSCULAR; INTRAVENOUS EVERY 8 HOURS PRN
Status: DISCONTINUED | OUTPATIENT
Start: 2020-09-26 | End: 2020-09-30

## 2020-09-26 RX ORDER — FAMOTIDINE 20 MG/1
20 TABLET ORAL NIGHTLY
Status: DISCONTINUED | OUTPATIENT
Start: 2020-09-26 | End: 2020-09-30

## 2020-09-26 RX ORDER — ALBUTEROL SULFATE 90 UG/1
1 AEROSOL, METERED RESPIRATORY (INHALATION) EVERY 6 HOURS PRN
Status: DISCONTINUED | OUTPATIENT
Start: 2020-09-26 | End: 2020-09-30

## 2020-09-26 RX ORDER — ASPIRIN 325 MG
325 TABLET, DELAYED RELEASE (ENTERIC COATED) ORAL 2 TIMES DAILY
Status: DISCONTINUED | OUTPATIENT
Start: 2020-09-26 | End: 2020-09-30

## 2020-09-26 NOTE — PLAN OF CARE
Patient admitted to unit from ER at approx 1015 via cart and transport. Transferred to bed. Welcomed and oriented to unit, order sets, POC, safety, call and phone systems. Call don't fall, IS and ankle exercises. Discussed pain management POC.  Patient se

## 2020-09-26 NOTE — H&P
[facility] Hospitalist H&P/Consult note       CC: back / sacral pain     PCP: PHYSICIAN NONSTAFF    History of Present Illness: Patient is a 68year old female with PMH sig for emphysema, hx of anal cancer, here for back / sacral pain.      Pt sustained a fall abo [place]OT [place] Right [place]   • HYSTERECTOMY  1985    for fibroids   • IMPLANT LEFT     • IMPLANT RIGHT     •      • OTHER SURGICAL HISTORY      10 breast surg - implants - no cancer  -    • OTHER SURGICAL HISTORY      BSO next OBJECTIVE:  /74   Pulse 97   Temp 97 °F (36.1 °C) (Temporal)   Resp 18   Ht 149.9 cm (4' 11\")   Wt 140 lb (63.5 kg)   LMP  (LMP Unknown)   SpO2 94%   BMI 28.28 kg/m²   General:  Alert, no distress, appears stated age.   Appears in pain when moved 1.  Acute fracture of the right superior pubic ramus. 2. No definite sacral or coccygeal fracture.    Dictated by (CST): Ghulam Miller MD on 9/02/2020 at 9:16 PM     Finalized by (CST): Ghulam Miller MD on 9/02/2020 at 9:17 PM       Xr Chest Ap Portable  ( (CST): Artur Mei MD on 9/02/2020 at 9:16 PM          ASSESSMENT / PLAN:     Patient is a 68year old female with PMH sig for emphysema, hx of anal cancer, here for back / sacral pain.     Impression    -sp fall early Sept and with Comminuted R Superior

## 2020-09-26 NOTE — CONSULTS
[facility]  Neurosurgery Consult    Kezia Childress Patient Status:  Emergency    1940 MRN [id number]   Location [facility] Attending No att. providers found   Hosp Day # 0 PCP Marie Dempsey HYSTERECTOMY      for fibroids   • IMPLANT LEFT     • IMPLANT RIGHT     •      • OTHER SURGICAL HISTORY      10 breast surg - implants - no cancer  -    • OTHER SURGICAL HISTORY      BSO next year   • OTHER SURGICAL HISTORY      left acute distress. HEENT:  Normocephalic, atraumatic  LUNGS: Clear to auscultation bilaterally. HEART:  S1, S2, Regular rate and rhythm. NEUROLOGICAL:  This patient is alert and orientated x 3. Speech fluent. Able to follow simple commands.    Pupils [facility] 9/11/18, Right Gastroc Slide, Lat Calc Osteotomy, FDL to Post Tib Tendon Transfer w/ Dr. Fito Diaz Exp 12/10/18     Thyroid nodule - repeat US in 2020, noted on R thyroid lobe, left thyroid lobe resected in 2019     Mixed hyperlipidemia     Fecal inc

## 2020-09-26 NOTE — ED INITIAL ASSESSMENT (HPI)
67 yo F, hx of pelvic fracture presents for pain control. Pt reports pain not under control with oral pain medication. Received 50 mcg fentanyl by EMS. Pain 5/10 at this time.  Pt reports she has been having pain in right groin but today pain is radiating t

## 2020-09-27 NOTE — PROGRESS NOTES
CC: follow-up hospital admission sacral fx    SUBJECTIVE:  Interval History:     Still with pain but able to move better today  Has had 2 BMs  Daughter at bs   Having cramps in left leg    OBJECTIVE:  Scheduled Meds:   • famoTIDine  20 mg Oral Nightly   • fracture  -L5 TP fracture  -pelvic fx     -urinary retnetion     -emphysema  -anal cancer hx     Plan     *s/p fall and pelvic fx  -initially admitted early Sept for fall, found to have pubic taqueria fx. Went home, now with worsening pain.  Denied any new fall

## 2020-09-27 NOTE — HOME CARE LIAISON
Ptnt current with [facility] block sn/pt/ot/hha  Will need a SIERRA order on or before dc    Thanks  Maurice Cardozo

## 2020-09-27 NOTE — CONSULTS
[facility]    Report of Consultation    Jordan Enamorado Patient Status:  Inpatient    1940 MRN [id number]   [facility] 3SW-A Attending Liz Tipton,    Hosp Day # 1 PCP PHYSICIAN NONSTAFF     Date of Admission:  2020 CATARACT  11/2017   • CHOLECYSTECTOMY  2015   • COLONOSCOPY  2015    normal   • FOOT OSTEOTOMY Right 9/11/2018    Performed by Asia Velasco MD at [facility] OR   • FOOT SURGERY Right 09/11/2018    [place]   • HYSTERECTOMY  1985    for fibroids   • IMP (NORCO) 5-325 MG per tab 1 tablet, 1 tablet, Oral, Q4H PRN    Or    •  HYDROcodone-acetaminophen (NORCO) 5-325 MG per tab 2 tablet, 2 tablet, Oral, Q4H PRN    •  ondansetron HCl (ZOFRAN) injection 4 mg, 4 mg, Intravenous, Q6H PRN    •  Metoclopramide HCl ( indigestion  Cefdinir                DIARRHEA  Clarithromycin              Comment:Other reaction(s): Gastritis  Codeine                 NAUSEA AND VOMITING  Levaquin                UNKNOWN  Vytorin                 UNKNOWN    Comment:Weakness -  Zocor [Sim function: 5/5 for HF, KE, DF, PF, EHL        Results:     Laboratory Data:  Lab Results   Component Value Date    WBC 7.1 09/26/2020    HGB 12.0 09/26/2020    HCT 37.4 09/26/2020    .0 09/26/2020    CREATSERUM 0.69 09/26/2020    BUN 10 09/26/2020

## 2020-09-27 NOTE — PLAN OF CARE
PT AOX4 this pm. VSS on RA. , using IS. SCDS on bilaterally, wearing intermittently. On ASA and heparin. Voiding with bravo, placed for urinary retention. On bedrest with commode privileges, awaiting ortho clearance.  Nystatin cream applied to groin for

## 2020-09-27 NOTE — PROGRESS NOTES
[facility]  Orthopedic Surgery  Progress Note    Lani Novak Patient Status:  Inpatient    1940 MRN [id number]   [facility] 3SW-A Attending Mike Stanford DO   Hosp Day # 1 PCP PHYSICIAN NONSTAFF     SUBJECTIVE:  Jocelynn Montelongo

## 2020-09-28 ENCOUNTER — APPOINTMENT (OUTPATIENT)
Dept: GENERAL RADIOLOGY | Facility: HOSPITAL | Age: 80
DRG: 552 | End: 2020-09-28
Attending: HOSPITALIST
Payer: MEDICARE

## 2020-09-28 PROCEDURE — 71046 X-RAY EXAM CHEST 2 VIEWS: CPT | Performed by: HOSPITALIST

## 2020-09-28 NOTE — PLAN OF CARE
Plan of care explained and updated with patient input. Progressing per plan of care. Patient is alert and oriented to person, place, time and situation. On room air with continuous pulse oximetry monitoring in place. SCDs bilaterally.  Administered aspirin

## 2020-09-28 NOTE — PROGRESS NOTES
CC: follow-up hospital admission sacral fx    SUBJECTIVE:  Interval History:     Able to void  Having bms  No nv  Pain still there, about the same  Will be seeing pt today    Remains on O2, no sob, deneid any cough  Reports hx of emphysema but not on inhal Pubic ramus fracture & nondisplaced fracture of R inferior pubic ramus   -newly found bl sacral ala fractures, mild displaced  -coccyx tip fracture  -L5 TP fracture  -pelvic fx     -urinary retnetion     -emphysema  -anal cancer hx     Plan     *s/p fall a

## 2020-09-28 NOTE — PROGRESS NOTES
ORTHOPEDIC SURGERY PROGRESS NOTE       SUBJECTIVE:  Doing well. Pain is improved. The pain she does have is in the sacral area. Minimal to no groin pain.   Ambulating with a walker with PT    OBJECTIVE:  Blood pressure 123/52, pulse 86, temperature 98.2

## 2020-09-28 NOTE — PLAN OF CARE
Pt AOx4. 1Lo2. C/o moderate pain to back, relieved by PO pain meds. VS remain stable. Voiding freely, BM this morning. Tolerating diet, denies n/v. Pt up min w/walker, moves slowly d/t pain. SCDs bilaterally, ankle pumps encouraged. IS encouraged.   P

## 2020-09-28 NOTE — CM/SW NOTE
Patient is current with [facility]idential [facility]  P:[phone]  F:[phone]. PT/OT recommend Home with HHC. Resumption order placed. Undetermined dc date. CHATA will continue to monitor.     Krystal Jefferson LCSW

## 2020-09-28 NOTE — PHYSICAL THERAPY NOTE
PHYSICAL THERAPY EVALUATION - INPATIENT     Room Number: 384/384-A  Evaluation Date: 9/28/2020  Type of Evaluation: Initial  Physician Order: PT Eval and Treat    Presenting Problem: closed fracture of transverse process of lumbar vertebra  Reason fo greater than left. 3. No compression fracture. 4. Details as above. Continued clinical correlation recommended. MRI sacrum/coccyx dated 9/26/20:    CONCLUSION:    1. Bilateral sacral ala fractures, right greater than left, mildly displaced.   2. No left leg muscle surgery   • REDUCTION LEFT  1970's   • REDUCTION RIGHT  1970's   • THYROIDECTOMY  02/24/2019    partial    • THYROIDECTOMY Left 2/26/2019    Performed by Yanick Thomas MD at [address] [place] [address]  Type of Home: [facility] lumbar spine. Assess through observation of functional mobility and found to be within functional limits as pt is able to move through bed mobility, transfers and ambulation without assistance.      BALANCE  Static Sitting: Fair  Dynamic Sitting: [place] throughout. CGA for assistance for the LE movement in bed. Pt cued for pacing and proper breathing. Pt then sitting EOB. Pt instructed in hand placement and integration of RW with transfers and ambulation.  Pt with CGA for sit<>stand to RW and transfer to b decreased body mechanics with bed mobility and gait, and decreased overall functional mobility.   Functional outcome measures completed include The AM-PAC '6-Clicks' Inpatient Basic Mobility Short Form was completed and this patient is demonstrating a 50.57

## 2020-09-28 NOTE — PLAN OF CARE
Call placed in AM to physical therapy to inform of new order, pt will see patient today if schedule permits otherwise will eval patient tomorrow. Pain controlled with current routine, patient declines increase in pain medications.  Patient voids since rem

## 2020-09-29 NOTE — PLAN OF CARE
Pt a/o x4. Pt states feeling no numbness or tingling. /IS encouraged. 2L oxygen nasal cannula when sleeping. SCDs on and ankle pumps encouraged while in bed. Voiding freely using bedside commode. Last bowel movement today.  WBAT minimal assist with walke

## 2020-09-29 NOTE — PLAN OF CARE
Problem: Impaired Activities of Daily Living  Goal: Achieve highest/safest level of independence in self care  Description: Interventions:  - Assess ability and encourage patient to participate in ADLs to maximize function  - Promote sitting position [facility]

## 2020-09-29 NOTE — OCCUPATIONAL THERAPY NOTE
OCCUPATIONAL THERAPY EVALUATION - INPATIENT     Room Number: 384/384-A  Evaluation Date: 9/29/2020  Type of Evaluation: Initial  Presenting Problem: transverse process fx lumbar vertebrae, recent B pelvic fractures    Physician Order: IP Consult to Familia Richmond • APPENDECTOMY     • CATARACT  11/2017   • CHOLECYSTECTOMY  2015   • COLONOSCOPY  2015    normal   • FOOT OSTEOTOMY Right 9/11/2018    Performed by Rj Dorsey MD at [facility] OR   • FOOT SURGERY Right 09/11/2018    [address] [place] [address] RESTRICTION  Weight Bearing Restriction: R lower extremity;L lower extremity        R Lower Extremity: Weight Bearing as Tolerated  L Lower Extremity: Weight Bearing as Tolerated    PAIN ASSESSMENT  Ratin  Location: back  Management Techniques:  Activit throughout session; education on bed mobility including logroll technique, performed supine to sit min assist, reports typically sleeps laying on couch so easier to get up/can get leverage pushing on back of chair; introduction on LB dressing techniques/eq functioning below her previous functional level and would benefit from skilled inpatient OT to address the above deficits, maximizing patient’s ability to return safely to her prior level of function.     Patient Complexity  Occupational Profile/Medical His

## 2020-09-29 NOTE — PLAN OF CARE
Alert and oriented. V/S stable,,desats when sleeping,sleeps with her mouth open and desats,O2 applied. NO SOB or any distress presented. Medicated for pain with moderate relief. Safety measures reinforced,call light kept within reach,instructed to call for

## 2020-09-29 NOTE — PROGRESS NOTES
CC: follow-up hospital admission sacral fx    SUBJECTIVE:  Interval History:   Pt seen and examined. States pain better controlled. Had BM. No F/C, N/V, or SOB.      OBJECTIVE:  Scheduled Meds:   • famoTIDine  20 mg Oral Nightly   • docusate sodium  100 fractures, mild displaced  -coccyx tip fracture  -L5 TP fracture, no acute surgical intervention per NSGY  -pelvic fx     -urinary retnetion     -emphysema  -anal cancer hx     Plan     *s/p fall and pelvic fx  *L5 transverse process fx  -initially admitte

## 2020-09-29 NOTE — PHYSICAL THERAPY NOTE
PHYSICAL THERAPY TREATMENT NOTE - INPATIENT    Room Number: 384/384-A     Session: 1   Number of Visits to Meet Established Goals: 5    Presenting Problem: closed fracture of transverse process of lumbar vertebra       History related to current admission Sabra Lockwood MD at [facility] MAIN OR   • FOOT SURGERY Right 2018    [place]   • HYSTERECTOMY      for fibroids   • IMPLANT LEFT     • IMPLANT RIGHT     •      • OTHER SURGICAL HISTORY      10 breast surg - implants - no cancer  -    • OTHER room?: A Little   -   Climbing 3-5 steps with a railing?: Total       AM-PAC Score:  Raw Score: 17   Approx Degree of Impairment: 50.57%   Standardized Score (AM-PAC Scale): 42.13   CMS Modifier (G-Code): CK    FUNCTIONAL ABILITY STATUS  Gait Assessment demonstrate supine - sit EOB @ level: modified independent      Goal #2 Patient is able to demonstrate transfers Sit to/from Stand at assistance level: modified independent      Goal #3 Patient is able to ambulate 50 feet with assist device: walker - rolli

## 2020-09-29 NOTE — PROGRESS NOTES
Assumed care of pt at 1100. Pt alert and oriented x4. Weaned to RA. IS encouraged. Pain controlled with PO pain medication. Instructed to call for assistance. Call light within reach. Will continue to monitor.

## 2020-09-30 VITALS
WEIGHT: 140 LBS | OXYGEN SATURATION: 92 % | HEART RATE: 82 BPM | RESPIRATION RATE: 18 BRPM | DIASTOLIC BLOOD PRESSURE: 64 MMHG | HEIGHT: 59 IN | BODY MASS INDEX: 28.22 KG/M2 | SYSTOLIC BLOOD PRESSURE: 122 MMHG | TEMPERATURE: 98 F

## 2020-09-30 RX ORDER — HYDROCODONE BITARTRATE AND ACETAMINOPHEN 5; 325 MG/1; MG/1
1-2 TABLET ORAL EVERY 4 HOURS PRN
Qty: 20 TABLET | Refills: 0 | Status: SHIPPED | OUTPATIENT
Start: 2020-09-30 | End: 2020-10-02

## 2020-09-30 RX ORDER — POLYETHYLENE GLYCOL 3350 17 G/17G
17 POWDER, FOR SOLUTION ORAL DAILY PRN
Qty: 30 EACH | Refills: 0 | Status: SHIPPED | COMMUNITY
Start: 2020-09-30 | End: 2020-11-24 | Stop reason: ALTCHOICE

## 2020-09-30 RX ORDER — PSEUDOEPHEDRINE HCL 30 MG
100 TABLET ORAL 2 TIMES DAILY
Qty: 30 CAPSULE | Refills: 0 | Status: SHIPPED | COMMUNITY
Start: 2020-09-30 | End: 2020-11-24 | Stop reason: ALTCHOICE

## 2020-09-30 NOTE — PROGRESS NOTES
Went over discharge instructions. Lenard walker given to patient. Script for [place] given to patient. Patient going home with daughter staying with her until Saturday. Patient has all belongings. Put in for wheelchair to take patient to lobby at this time.

## 2020-09-30 NOTE — PLAN OF CARE
Pt a/ox4. RA//IS encouraged. 2L nasal cannula when sleeping. Pt has history of emphysema and sats low 90% when off O2. SCDs and ankle pumps encouraged while in bed resting.  Nystatin cream applied to groins and under belly skin is clean/dry and intact sli

## 2020-09-30 NOTE — OCCUPATIONAL THERAPY NOTE
OCCUPATIONAL THERAPY TREATMENT NOTE - INPATIENT     Room Number: 384/384-A  Session: 1   Number of Visits to Meet Established Goals: 3    Presenting Problem: transverse process fx lumbar vertebrae, recent B pelvic fractures    History related to current ad OSTEOTOMY Right 2018    Performed by Cruz Johnson MD at [facility] MAIN OR   • FOOT SURGERY Right 2018    [place]   • HYSTERECTOMY      for fibroids   • IMPLANT LEFT     • IMPLANT RIGHT     •      • OTHER SURGICAL HISTORY Provided:   PPE: Surgical mask and gloves worn. Educated on spinal precautions, pt with no recall from prior therapy sessions. Educated extensively on log roll.  Pt initiates rolling to side multiple times with CGA, returning self to supine position due training; Compensatory technique education  Rehab Potential : Good  Frequency (Obs): 3-5x/week      OT Goals: all goals ongoing 9/30  ADL GOALS   Patient will perform Lower Body Dressing with supervision (if patient agreeable, reports will not wear anything

## 2020-09-30 NOTE — PLAN OF CARE
Pain controlled with prn Norco. Ambulating with min assist/walker. Tolerating well. VSS. RA when awake. 2L NC applied when asleep. Encouraging use if IS. Voiding. scds on. Call light within reach.

## 2020-09-30 NOTE — DISCHARGE SUMMARY
General Medicine Discharge Summary     Patient ID:  Neeta Rucker  68year old  8/13/1940    Admit date: 9/26/2020    Discharge date and time: 9/30/2020    Attending Physician: Zhao Butterfield DO instructions:      Current Discharge Medication List    START taking these medications    HYDROcodone-acetaminophen 5-325 MG Oral Tab  Take 1-2 tablets by mouth every 4 (four) hours as needed for Pain.     docusate sodium 100 MG Oral Cap  Take 100 mg by amara

## 2020-09-30 NOTE — PHYSICAL THERAPY NOTE
PHYSICAL THERAPY TREATMENT NOTE - INPATIENT    Room Number: 384/384-A     Session: 2  Number of Visits to Meet Established Goals: 5    Presenting Problem: closed fracture of transverse process of lumbar vertebra  History related to current admission:  Pt correlation recommended. MRI sacrum/coccyx dated 9/26/20:     CONCLUSION:    1. Bilateral sacral ala fractures, right greater than left, mildly displaced. 2. Nondisplaced fracture involving the tip of the coccyx.       Problem List  Principal Problem THYROIDECTOMY  02/24/2019    partial    • THYROIDECTOMY Left 2/26/2019    Performed by Jayda Davies MD at [address]  \"I woke up at 5 am in excruciating pain, I needed 2 pain pills\"    Patient’s self-stated goal is to have less pain seen.  PPE donned by therapist, mask and gloves. OT present during session. Pt educated on goals and course of PT and spine precautions by OT. Pt instructed in log roll technique for supine to sit but was unable to complete secondary to pain.   Pt attempt Goal #2 Patient is able to demonstrate transfers Sit to/from Stand at assistance level: modified independent      Goal #3 Patient is able to ambulate 50 feet with assist device: walker - rolling at assistance level: modified independent      Goal #4 Pt t

## 2020-10-01 NOTE — ED PROVIDER NOTES
Patient Seen in: [facility] 3sw-a      History   No chief complaint on file. Stated Complaint: Hip pain    HPI    65-year-old female presents ED with complaints of low back pain. Patient states that she was diagnosed with a pelvic fracture.   Urszula Dangelo RIGHT  's   •      • OTHER SURGICAL HISTORY      10 breast surg - implants - no cancer  -    • OTHER SURGICAL HISTORY      BSO next year   • OTHER SURGICAL HISTORY  2012    left leg muscle surgery   • REDUCTION LEFT  s   • REDUCTION RIGHT  197 Rhythm: Normal rate and regular rhythm. Pulmonary:      Effort: Pulmonary effort is normal.      Breath sounds: Normal breath sounds. Abdominal:      General: Bowel sounds are normal.      Palpations: Abdomen is soft.    Musculoskeletal:         General ------                     CBC W/ DIFFERENTIAL[[id number]]                              Final result                 Please view results for these tests on the individual orders. CBC WITH DIFFERENTIAL WITH PLATELET    Narrative:      The following ord ring.  The most severe is adjacent to the pubic symphysis. The fracture fragments appear mildly to moderately displaced. There is likely a hairline fracture involving the left ischial   ring at the pubis.   There is moderate degenerative change at the pub bilateral sacral ala fractures. Minimally displaced on the right. There is moderate sacroiliac joint arthritis. Atherosclerotic changes are seen. There is fusiform dilatation of the infrarenal abdominal aorta. Maximal diameter is measured at 2. fracture of sacrum, unspecified portion of sacrum, initial encounter [facility])    Disposition:  Admit  9/30/2020 11:25 pm    Follow-up:  Nonstaff, Physician  [id number]    In 1 week      Emily Parsons MD  [id number] [facility]

## 2020-10-09 LAB
ALBUMIN/GLOB SERPL: 1.4 (CALC) (ref 1–2.5)
ALBUMIN/GLOB SERPL: 1.5 (CALC) (ref 1–2.5)
ALBUMIN/GLOB SERPL: 1.6 (CALC) (ref 1–2.5)
ALBUMIN: 4 G/DL (ref 3.6–5.1)
ALBUMIN: 4.1 G/DL (ref 3.6–5.1)
ALBUMIN: 4.2 G/DL (ref 3.6–5.1)
ALKALINE PHOSPHATASE: 110 UNIT/L (ref 33–130)
ALKALINE PHOSPHATASE: 77 UNIT/L (ref 33–130)
ALKALINE PHOSPHATASE: 77 UNIT/L (ref 33–130)
ALT: 10 UNIT/L (ref 6–29)
ALT: 13 UNIT/L (ref 6–29)
ALT: 16 UNIT/L (ref 6–29)
AST: 16 UNIT/L (ref 10–35)
AST: 18 UNIT/L (ref 10–35)
AST: 23 UNIT/L (ref 10–35)
BASO%: 0.5 %
BASO%: 0.6 %
BASO%: 0.6 %
BASO: 0 10^3/UL
BILIRUBIN, TOTAL: 0.4 MG/DL (ref 0.2–1.2)
BILIRUBIN, TOTAL: 0.4 MG/DL (ref 0.2–1.2)
BILIRUBIN, TOTAL: 0.5 MG/DL (ref 0.2–1.2)
BUN/CREATININE RATIO: ABNORMAL (CALC) (ref 6–22)
BUN/CREATININE RATIO: NORMAL (CALC) (ref 6–22)
BUN/CREATININE RATIO: NORMAL (CALC) (ref 6–22)
CALCIUM: 9.4 MG/DL (ref 8.6–10.4)
CALCIUM: 9.5 MG/DL (ref 8.6–10.4)
CALCIUM: 9.5 MG/DL (ref 8.6–10.4)
CARBON DIOXIDE: 19 MMOL/L (ref 20–31)
CARBON DIOXIDE: 27 MMOL/L (ref 20–31)
CARBON DIOXIDE: 28 MMOL/L (ref 20–32)
CHLORIDE: 103 MMOL/L (ref 98–110)
CHLORIDE: 104 MMOL/L (ref 98–110)
CHLORIDE: 106 MMOL/L (ref 98–110)
CRCL (C&G) (MOSAIQ HL): 60.6 ML/MIN
CRCL (C&G) (MOSAIQ HL): 60.85 ML/MIN
CRCL (C&G) (MOSAIQ HL): 62.3 ML/MIN
CREATININE CLEARANCE (MOSAIQ HL): 60 ML/MIN
CREATININE CLEARANCE (MOSAIQ HL): 60.1 ML/MIN
CREATININE CLEARANCE (MOSAIQ HL): 61.4 ML/MIN
CREATININE: 0.78 MG/DL (ref 0.6–0.93)
CREATININE: 0.81 MG/DL (ref 0.6–0.93)
CREATININE: 0.82 MG/DL (ref 0.6–0.93)
EGFR AFRICAN AMERICAN: 82 ML/MIN/1.73M2
EGFR AFRICAN AMERICAN: 82 ML/MIN/1.73M2
EGFR AFRICAN AMERICAN: 86 ML/MIN/1.73M2
EGFR NON-AFR. AMERICAN: 70 ML/MIN/1.73M2
EGFR NON-AFR. AMERICAN: 71 ML/MIN/1.73M2
EGFR NON-AFR. AMERICAN: 74 ML/MIN/1.73M2
EOS%: 1.9 %
EOS%: 2.2 %
EOS%: 2.9 %
EOS: 0.1 10^3/UL
EOS: 0.1 10^3/UL
EOS: 0.2 10^3/UL
GLOBULIN: 2.7 G/DL (CALC) (ref 1.9–3.7)
GLOBULIN: 2.8 G/DL (CALC) (ref 1.9–3.7)
GLOBULIN: 2.9 G/DL (CALC) (ref 1.9–3.7)
GLUCOSE: 84 MG/DL (ref 65–99)
GLUCOSE: 87 MG/DL (ref 65–99)
GLUCOSE: 92 MG/DL (ref 65–99)
HCT: 43.1 % (ref 38–54)
HCT: 43.4 % (ref 38–54)
HCT: 46.2 % (ref 38–54)
HGB: 14.5 G/DL (ref 12–18)
HGB: 14.7 G/DL (ref 12–18)
HGB: 15 G/DL (ref 12–18)
HOC INR: 2
HOC PT: 23.9
LYMPH%: 19.9 % (ref 12–44)
LYMPH%: 20.4 % (ref 12–44)
LYMPH%: 22 % (ref 12–44)
LYMPH: 1.2 10^3/UL (ref 0.8–2.8)
LYMPH: 1.4 10^3/UL (ref 0.8–2.8)
LYMPH: 1.4 10^3/UL (ref 0.8–2.8)
MCH: 28.4 PG (ref 26–33)
MCH: 29.3 PG (ref 26–33)
MCH: 29.6 PG (ref 26–33)
MCHC: 32.5 G/DL (ref 31–36)
MCHC: 33.6 G/DL (ref 31–36)
MCHC: 33.9 G/DL (ref 31–36)
MCV: 87.1 FML (ref 82–100)
MCV: 87.3 FML (ref 82–100)
MCV: 87.5 FML (ref 82–100)
MONO%: 5.5 % (ref 2–12)
MONO%: 6.8 % (ref 2–12)
MONO%: 6.9 % (ref 2–12)
MONO: 0.4 10^3/UL (ref 0.2–1)
MONO: 0.4 10^3/UL (ref 0.2–1)
MONO: 0.5 10^3/UL (ref 0.2–1)
MPV: 8.6 FML (ref 8.6–11.7)
MPV: 8.6 FML (ref 8.6–11.7)
MPV: 9 FML (ref 8.6–11.7)
NEUT%: 69.3 % (ref 47–76)
NEUT%: 69.8 % (ref 47–76)
NEUT%: 70.7 % (ref 47–76)
NEUT: 4.4 10^3/UL (ref 1.5–7.1)
NEUT: 4.5 10^3/UL (ref 1.5–7.1)
NEUT: 4.8 10^3/UL (ref 1.5–7.1)
PLT: 227 10^3/UL (ref 150–375)
PLT: 237 10^3/UL (ref 150–375)
PLT: 256 10^3/UL (ref 150–375)
POTASSIUM: 4.3 MMOL/L (ref 3.5–5.3)
POTASSIUM: 4.3 MMOL/L (ref 3.5–5.3)
POTASSIUM: 4.5 MMOL/L (ref 3.5–5.3)
PROTEIN, TOTAL: 6.9 G/DL (ref 6.1–8.1)
RBC: 4.95 10^6/UL (ref 4.2–6.2)
RBC: 4.96 10^6/UL (ref 4.2–6.2)
RBC: 5.29 10^6/UL (ref 4.2–6.2)
RDW-CV: 13.7 %
RDW-CV: 13.7 %
RDW-CV: 14.2 %
RDW-SD: 42.4 FML (ref 36–50)
RDW-SD: 42.7 FML (ref 36–50)
RDW-SD: 45.3 FML (ref 36–50)
SODIUM: 139 MMOL/L (ref 135–146)
SODIUM: 140 MMOL/L (ref 135–146)
SODIUM: 141 MMOL/L (ref 135–146)
UREA NITROGEN (BUN): 17 MG/DL (ref 7–25)
UREA NITROGEN (BUN): 19 MG/DL (ref 7–25)
UREA NITROGEN (BUN): 22 MG/DL (ref 7–25)
WBC: 6.2 10^3/UL (ref 4.3–11)
WBC: 6.5 10^3/UL (ref 4.3–11)
WBC: 7 10^3/UL (ref 4.3–11)

## 2020-10-11 VITALS
WEIGHT: 141.8 LBS | SYSTOLIC BLOOD PRESSURE: 116 MMHG | BODY MASS INDEX: 28.59 KG/M2 | HEIGHT: 59 IN | DIASTOLIC BLOOD PRESSURE: 66 MMHG

## 2020-10-11 VITALS
HEIGHT: 59 IN | SYSTOLIC BLOOD PRESSURE: 122 MMHG | BODY MASS INDEX: 28.55 KG/M2 | DIASTOLIC BLOOD PRESSURE: 66 MMHG | WEIGHT: 141.6 LBS

## 2020-10-11 VITALS
SYSTOLIC BLOOD PRESSURE: 124 MMHG | HEIGHT: 59 IN | DIASTOLIC BLOOD PRESSURE: 68 MMHG | WEIGHT: 140.61 LBS | BODY MASS INDEX: 28.35 KG/M2

## 2020-11-03 ENCOUNTER — HOSPITAL ENCOUNTER (OUTPATIENT)
Dept: CT IMAGING | Facility: HOSPITAL | Age: 80
Discharge: HOME | End: 2020-11-03
Attending: ORTHOPAEDIC SURGERY
Payer: MEDICARE

## 2020-11-03 DIAGNOSIS — M84.48XA BILATERAL SACRAL INSUFFICIENCY FRACTURE, INITIAL ENCOUNTER: ICD-10-CM

## 2020-11-03 PROCEDURE — 76376 3D RENDER W/INTRP POSTPROCES: CPT | Performed by: ORTHOPAEDIC SURGERY

## 2020-11-03 PROCEDURE — 72192 CT PELVIS W/O DYE: CPT | Performed by: ORTHOPAEDIC SURGERY

## 2020-11-04 NOTE — PROGRESS NOTES
CT scan of the pelvis reviewed. Scan demonstrates interval healing of the bilateral sacral ala insufficiency fractures with no further displacement. Stable appearance of the anterior pubic ramus fracture.     Results discussed with the patient over the ph

## 2020-12-31 ENCOUNTER — PRIOR ORIGINAL RECORDS (OUTPATIENT)
Dept: OTHER | Age: 80
End: 2020-12-31

## 2021-01-05 ENCOUNTER — IMMUNIZATION (OUTPATIENT)
Dept: LAB | Facility: HOSPITAL | Age: 81
End: 2021-01-05

## 2021-01-05 DIAGNOSIS — Z23 NEED FOR VACCINATION: ICD-10-CM

## 2021-01-05 PROCEDURE — 0011A SARSCOV2 VAC 100MCG/0.5ML IM: CPT

## 2021-02-02 ENCOUNTER — IMMUNIZATION (OUTPATIENT)
Dept: LAB | Facility: HOSPITAL | Age: 81
End: 2021-02-02
Attending: PREVENTIVE MEDICINE
Payer: MEDICARE

## 2021-02-02 DIAGNOSIS — Z23 NEED FOR VACCINATION: Primary | ICD-10-CM

## 2021-02-02 PROCEDURE — 0012A SARSCOV2 VAC 100MCG/0.5ML IM: CPT

## 2021-05-23 NOTE — PLAN OF CARE
Problem: Patient/Family Goals  Goal: Patient/Family Long Term Goal  Description  Patient's Long Term Goal: DISCHARGE    Interventions:  - RETURN TO REGULAR ADL'S  -TOLERATE ADVANCED DIET  -AMBULATE AROUND THE KAMARA 3 TIMES A DAY.  - See additional Care Pl function  Description  INTERVENTIONS:  - Assess bowel function  - Maintain adequate hydration with IV or PO as ordered and tolerated  - Evaluate effectiveness of GI medications  - Encourage mobilization and activity  - Obtain nutritional consult as needed s/p R Hemiarthoplasty

## 2021-08-22 ENCOUNTER — APPOINTMENT (OUTPATIENT)
Dept: GENERAL RADIOLOGY | Facility: HOSPITAL | Age: 81
DRG: 482 | End: 2021-08-22
Attending: EMERGENCY MEDICINE
Payer: MEDICARE

## 2021-08-22 ENCOUNTER — HOSPITAL ENCOUNTER (INPATIENT)
Facility: HOSPITAL | Age: 81
LOS: 3 days | Discharge: HOME HEALTH | DRG: 482 | End: 2021-08-25
Attending: EMERGENCY MEDICINE | Admitting: INTERNAL MEDICINE
Payer: MEDICARE

## 2021-08-22 DIAGNOSIS — S72.001A CLOSED FRACTURE OF RIGHT HIP, INITIAL ENCOUNTER (HCC): Primary | ICD-10-CM

## 2021-08-22 LAB
ANION GAP SERPL CALC-SCNC: 4 MMOL/L (ref 0–18)
BASOPHILS # BLD AUTO: 0.06 X10(3) UL (ref 0–0.2)
BASOPHILS NFR BLD AUTO: 0.6 %
BUN BLD-MCNC: 20 MG/DL (ref 7–18)
CALCIUM BLD-MCNC: 9.2 MG/DL (ref 8.5–10.1)
CHLORIDE SERPL-SCNC: 108 MMOL/L (ref 98–112)
CO2 SERPL-SCNC: 27 MMOL/L (ref 21–32)
CREAT BLD-MCNC: 0.76 MG/DL
EOSINOPHIL # BLD AUTO: 0.15 X10(3) UL (ref 0–0.7)
EOSINOPHIL NFR BLD AUTO: 1.5 %
ERYTHROCYTE [DISTWIDTH] IN BLOOD BY AUTOMATED COUNT: 13.1 %
GLUCOSE BLD-MCNC: 90 MG/DL (ref 70–99)
HCT VFR BLD AUTO: 44.9 %
HGB BLD-MCNC: 14.9 G/DL
IMM GRANULOCYTES # BLD AUTO: 0.05 X10(3) UL (ref 0–1)
IMM GRANULOCYTES NFR BLD: 0.5 %
LYMPHOCYTES # BLD AUTO: 1.25 X10(3) UL (ref 1–4)
LYMPHOCYTES NFR BLD AUTO: 12.2 %
MCH RBC QN AUTO: 29.2 PG (ref 26–34)
MCHC RBC AUTO-ENTMCNC: 33.2 G/DL (ref 31–37)
MCV RBC AUTO: 87.9 FL
MONOCYTES # BLD AUTO: 0.48 X10(3) UL (ref 0.1–1)
MONOCYTES NFR BLD AUTO: 4.7 %
NEUTROPHILS # BLD AUTO: 8.27 X10 (3) UL (ref 1.5–7.7)
NEUTROPHILS # BLD AUTO: 8.27 X10(3) UL (ref 1.5–7.7)
NEUTROPHILS NFR BLD AUTO: 80.5 %
OSMOLALITY SERPL CALC.SUM OF ELEC: 290 MOSM/KG (ref 275–295)
PLATELET # BLD AUTO: 236 10(3)UL (ref 150–450)
POTASSIUM SERPL-SCNC: 4.6 MMOL/L (ref 3.5–5.1)
RBC # BLD AUTO: 5.11 X10(6)UL
SARS-COV-2 RNA RESP QL NAA+PROBE: NOT DETECTED
SODIUM SERPL-SCNC: 139 MMOL/L (ref 136–145)
WBC # BLD AUTO: 10.3 X10(3) UL (ref 4–11)

## 2021-08-22 PROCEDURE — 73502 X-RAY EXAM HIP UNI 2-3 VIEWS: CPT | Performed by: EMERGENCY MEDICINE

## 2021-08-22 PROCEDURE — 73562 X-RAY EXAM OF KNEE 3: CPT | Performed by: EMERGENCY MEDICINE

## 2021-08-22 PROCEDURE — 85025 COMPLETE CBC W/AUTO DIFF WBC: CPT | Performed by: EMERGENCY MEDICINE

## 2021-08-22 PROCEDURE — 99285 EMERGENCY DEPT VISIT HI MDM: CPT | Performed by: EMERGENCY MEDICINE

## 2021-08-22 PROCEDURE — 93010 ELECTROCARDIOGRAM REPORT: CPT | Performed by: EMERGENCY MEDICINE

## 2021-08-22 PROCEDURE — 36415 COLL VENOUS BLD VENIPUNCTURE: CPT | Performed by: EMERGENCY MEDICINE

## 2021-08-22 PROCEDURE — 93005 ELECTROCARDIOGRAM TRACING: CPT

## 2021-08-22 PROCEDURE — 80048 BASIC METABOLIC PNL TOTAL CA: CPT | Performed by: EMERGENCY MEDICINE

## 2021-08-22 RX ORDER — SODIUM CHLORIDE 9 MG/ML
125 INJECTION, SOLUTION INTRAVENOUS CONTINUOUS
Status: DISCONTINUED | OUTPATIENT
Start: 2021-08-22 | End: 2021-08-22

## 2021-08-22 RX ORDER — HYDROMORPHONE HYDROCHLORIDE 1 MG/ML
0.4 INJECTION, SOLUTION INTRAMUSCULAR; INTRAVENOUS; SUBCUTANEOUS EVERY 2 HOUR PRN
Status: DISCONTINUED | OUTPATIENT
Start: 2021-08-22 | End: 2021-08-23

## 2021-08-22 RX ORDER — ACETAMINOPHEN AND CODEINE PHOSPHATE 300; 30 MG/1; MG/1
1 TABLET ORAL ONCE
Status: COMPLETED | OUTPATIENT
Start: 2021-08-22 | End: 2021-08-22

## 2021-08-22 RX ORDER — ACETAMINOPHEN 325 MG/1
650 TABLET ORAL EVERY 6 HOURS PRN
Status: DISCONTINUED | OUTPATIENT
Start: 2021-08-22 | End: 2021-08-25

## 2021-08-22 RX ORDER — ONDANSETRON 2 MG/ML
4 INJECTION INTRAMUSCULAR; INTRAVENOUS ONCE
Status: COMPLETED | OUTPATIENT
Start: 2021-08-22 | End: 2021-08-23

## 2021-08-22 RX ORDER — ALBUTEROL SULFATE 90 UG/1
2 AEROSOL, METERED RESPIRATORY (INHALATION) EVERY 6 HOURS PRN
Status: DISCONTINUED | OUTPATIENT
Start: 2021-08-22 | End: 2021-08-25

## 2021-08-22 RX ORDER — ONDANSETRON 2 MG/ML
4 INJECTION INTRAMUSCULAR; INTRAVENOUS EVERY 4 HOURS PRN
Status: ACTIVE | OUTPATIENT
Start: 2021-08-22 | End: 2021-08-22

## 2021-08-22 RX ORDER — SODIUM CHLORIDE 9 MG/ML
INJECTION, SOLUTION INTRAVENOUS CONTINUOUS
Status: DISCONTINUED | OUTPATIENT
Start: 2021-08-22 | End: 2021-08-24

## 2021-08-22 RX ORDER — DOCUSATE SODIUM 100 MG/1
100 CAPSULE, LIQUID FILLED ORAL 2 TIMES DAILY
Status: DISCONTINUED | OUTPATIENT
Start: 2021-08-22 | End: 2021-08-25

## 2021-08-22 RX ORDER — MV-MIN/VIT C/GLUT/LYSINE/HB124 250-12.5MG
3 TABLET,CHEWABLE ORAL DAILY
COMMUNITY

## 2021-08-22 RX ORDER — SODIUM CHLORIDE 9 MG/ML
INJECTION, SOLUTION INTRAVENOUS ONCE
Status: DISCONTINUED | OUTPATIENT
Start: 2021-08-22 | End: 2021-08-23

## 2021-08-22 RX ORDER — HYDROMORPHONE HYDROCHLORIDE 1 MG/ML
0.2 INJECTION, SOLUTION INTRAMUSCULAR; INTRAVENOUS; SUBCUTANEOUS EVERY 2 HOUR PRN
Status: DISCONTINUED | OUTPATIENT
Start: 2021-08-22 | End: 2021-08-23

## 2021-08-22 RX ORDER — MORPHINE SULFATE 4 MG/ML
4 INJECTION, SOLUTION INTRAMUSCULAR; INTRAVENOUS EVERY 30 MIN PRN
Status: DISCONTINUED | OUTPATIENT
Start: 2021-08-22 | End: 2021-08-25

## 2021-08-22 RX ORDER — HYDROMORPHONE HYDROCHLORIDE 1 MG/ML
0.8 INJECTION, SOLUTION INTRAMUSCULAR; INTRAVENOUS; SUBCUTANEOUS EVERY 2 HOUR PRN
Status: DISCONTINUED | OUTPATIENT
Start: 2021-08-22 | End: 2021-08-23

## 2021-08-22 RX ORDER — ENOXAPARIN SODIUM 100 MG/ML
40 INJECTION SUBCUTANEOUS NIGHTLY
Status: DISCONTINUED | OUTPATIENT
Start: 2021-08-22 | End: 2021-08-23

## 2021-08-22 RX ORDER — POLYETHYLENE GLYCOL 3350 17 G/17G
17 POWDER, FOR SOLUTION ORAL DAILY PRN
Status: DISCONTINUED | OUTPATIENT
Start: 2021-08-22 | End: 2021-08-25

## 2021-08-22 RX ORDER — BISACODYL 10 MG
10 SUPPOSITORY, RECTAL RECTAL
Status: DISCONTINUED | OUTPATIENT
Start: 2021-08-22 | End: 2021-08-25

## 2021-08-22 RX ORDER — MULTIVIT-MIN/IRON FUM/FOLIC AC 7.5 MG-4
1 TABLET ORAL DAILY
COMMUNITY
End: 2021-12-01

## 2021-08-22 RX ORDER — MORPHINE SULFATE 4 MG/ML
4 INJECTION, SOLUTION INTRAMUSCULAR; INTRAVENOUS EVERY 30 MIN PRN
Status: DISCONTINUED | OUTPATIENT
Start: 2021-08-22 | End: 2021-08-22 | Stop reason: ALTCHOICE

## 2021-08-22 NOTE — ED INITIAL ASSESSMENT (HPI)
Pt presents to ED with R hip pain s/p fall. Pt reports she had a pelvic fracture Sept 202--reports she learned to walk again in March 2021.  Reports normally using crutches to walk--reports today she was walking from bed to bathroom without crutches and fel

## 2021-08-23 ENCOUNTER — APPOINTMENT (OUTPATIENT)
Dept: GENERAL RADIOLOGY | Facility: HOSPITAL | Age: 81
DRG: 482 | End: 2021-08-23
Attending: ORTHOPAEDIC SURGERY
Payer: MEDICARE

## 2021-08-23 ENCOUNTER — ANESTHESIA EVENT (OUTPATIENT)
Dept: SURGERY | Facility: HOSPITAL | Age: 81
DRG: 482 | End: 2021-08-23
Payer: MEDICARE

## 2021-08-23 ENCOUNTER — ANESTHESIA (OUTPATIENT)
Dept: SURGERY | Facility: HOSPITAL | Age: 81
DRG: 482 | End: 2021-08-23
Payer: MEDICARE

## 2021-08-23 LAB
ANION GAP SERPL CALC-SCNC: 3 MMOL/L (ref 0–18)
ATRIAL RATE: 98 BPM
BASOPHILS # BLD AUTO: 0.07 X10(3) UL (ref 0–0.2)
BASOPHILS NFR BLD AUTO: 0.8 %
BUN BLD-MCNC: 16 MG/DL (ref 7–18)
CALCIUM BLD-MCNC: 8.2 MG/DL (ref 8.5–10.1)
CHLORIDE SERPL-SCNC: 109 MMOL/L (ref 98–112)
CO2 SERPL-SCNC: 27 MMOL/L (ref 21–32)
CREAT BLD-MCNC: 0.68 MG/DL
EOSINOPHIL # BLD AUTO: 0.12 X10(3) UL (ref 0–0.7)
EOSINOPHIL NFR BLD AUTO: 1.4 %
ERYTHROCYTE [DISTWIDTH] IN BLOOD BY AUTOMATED COUNT: 13.2 %
GLUCOSE BLD-MCNC: 110 MG/DL (ref 70–99)
HCT VFR BLD AUTO: 41.4 %
HGB BLD-MCNC: 13.8 G/DL
IMM GRANULOCYTES # BLD AUTO: 0.03 X10(3) UL (ref 0–1)
IMM GRANULOCYTES NFR BLD: 0.4 %
INR BLD: 1.02 (ref 0.89–1.11)
LYMPHOCYTES # BLD AUTO: 0.85 X10(3) UL (ref 1–4)
LYMPHOCYTES NFR BLD AUTO: 9.9 %
MCH RBC QN AUTO: 29.3 PG (ref 26–34)
MCHC RBC AUTO-ENTMCNC: 33.3 G/DL (ref 31–37)
MCV RBC AUTO: 87.9 FL
MONOCYTES # BLD AUTO: 0.35 X10(3) UL (ref 0.1–1)
MONOCYTES NFR BLD AUTO: 4.1 %
NEUTROPHILS # BLD AUTO: 7.14 X10 (3) UL (ref 1.5–7.7)
NEUTROPHILS # BLD AUTO: 7.14 X10(3) UL (ref 1.5–7.7)
NEUTROPHILS NFR BLD AUTO: 83.4 %
OSMOLALITY SERPL CALC.SUM OF ELEC: 290 MOSM/KG (ref 275–295)
P AXIS: 49 DEGREES
P-R INTERVAL: 132 MS
PLATELET # BLD AUTO: 203 10(3)UL (ref 150–450)
POTASSIUM SERPL-SCNC: 4.2 MMOL/L (ref 3.5–5.1)
PSA SERPL DL<=0.01 NG/ML-MCNC: 13.6 SECONDS (ref 12.2–14.5)
Q-T INTERVAL: 340 MS
QRS DURATION: 64 MS
QTC CALCULATION (BEZET): 406 MS
R AXIS: 6 DEGREES
RBC # BLD AUTO: 4.71 X10(6)UL
SODIUM SERPL-SCNC: 139 MMOL/L (ref 136–145)
T AXIS: -26 DEGREES
VENTRICULAR RATE: 86 BPM
WBC # BLD AUTO: 8.6 X10(3) UL (ref 4–11)

## 2021-08-23 PROCEDURE — 85610 PROTHROMBIN TIME: CPT | Performed by: HOSPITALIST

## 2021-08-23 PROCEDURE — 85025 COMPLETE CBC W/AUTO DIFF WBC: CPT | Performed by: HOSPITALIST

## 2021-08-23 PROCEDURE — 76000 FLUOROSCOPY <1 HR PHYS/QHP: CPT | Performed by: ORTHOPAEDIC SURGERY

## 2021-08-23 PROCEDURE — S0077 INJECTION, CLINDAMYCIN PHOSP: HCPCS | Performed by: ORTHOPAEDIC SURGERY

## 2021-08-23 PROCEDURE — 87081 CULTURE SCREEN ONLY: CPT | Performed by: HOSPITALIST

## 2021-08-23 PROCEDURE — 0QH634Z INSERTION OF INTERNAL FIXATION DEVICE INTO RIGHT UPPER FEMUR, PERCUTANEOUS APPROACH: ICD-10-PCS | Performed by: ORTHOPAEDIC SURGERY

## 2021-08-23 PROCEDURE — 80048 BASIC METABOLIC PNL TOTAL CA: CPT | Performed by: HOSPITALIST

## 2021-08-23 DEVICE — SCREW CANN PT 6.5 16X80: Type: IMPLANTABLE DEVICE | Site: HIP | Status: FUNCTIONAL

## 2021-08-23 RX ORDER — LIDOCAINE HYDROCHLORIDE 10 MG/ML
INJECTION, SOLUTION EPIDURAL; INFILTRATION; INTRACAUDAL; PERINEURAL AS NEEDED
Status: DISCONTINUED | OUTPATIENT
Start: 2021-08-23 | End: 2021-08-23 | Stop reason: SURG

## 2021-08-23 RX ORDER — ROCURONIUM BROMIDE 10 MG/ML
INJECTION, SOLUTION INTRAVENOUS AS NEEDED
Status: DISCONTINUED | OUTPATIENT
Start: 2021-08-23 | End: 2021-08-23 | Stop reason: SURG

## 2021-08-23 RX ORDER — SODIUM CHLORIDE, SODIUM LACTATE, POTASSIUM CHLORIDE, CALCIUM CHLORIDE 600; 310; 30; 20 MG/100ML; MG/100ML; MG/100ML; MG/100ML
INJECTION, SOLUTION INTRAVENOUS CONTINUOUS PRN
Status: DISCONTINUED | OUTPATIENT
Start: 2021-08-23 | End: 2021-08-23 | Stop reason: SURG

## 2021-08-23 RX ORDER — HYDROMORPHONE HYDROCHLORIDE 1 MG/ML
0.4 INJECTION, SOLUTION INTRAMUSCULAR; INTRAVENOUS; SUBCUTANEOUS EVERY 5 MIN PRN
Status: DISCONTINUED | OUTPATIENT
Start: 2021-08-23 | End: 2021-08-23 | Stop reason: HOSPADM

## 2021-08-23 RX ORDER — SODIUM CHLORIDE, SODIUM LACTATE, POTASSIUM CHLORIDE, CALCIUM CHLORIDE 600; 310; 30; 20 MG/100ML; MG/100ML; MG/100ML; MG/100ML
INJECTION, SOLUTION INTRAVENOUS CONTINUOUS
Status: DISCONTINUED | OUTPATIENT
Start: 2021-08-23 | End: 2021-08-23 | Stop reason: HOSPADM

## 2021-08-23 RX ORDER — HYDROMORPHONE HYDROCHLORIDE 1 MG/ML
0.4 INJECTION, SOLUTION INTRAMUSCULAR; INTRAVENOUS; SUBCUTANEOUS EVERY 2 HOUR PRN
Status: DISCONTINUED | OUTPATIENT
Start: 2021-08-23 | End: 2021-08-25

## 2021-08-23 RX ORDER — GLYCOPYRROLATE 0.2 MG/ML
INJECTION, SOLUTION INTRAMUSCULAR; INTRAVENOUS AS NEEDED
Status: DISCONTINUED | OUTPATIENT
Start: 2021-08-23 | End: 2021-08-23 | Stop reason: SURG

## 2021-08-23 RX ORDER — ACETAMINOPHEN 500 MG
1000 TABLET ORAL ONCE AS NEEDED
Status: DISCONTINUED | OUTPATIENT
Start: 2021-08-23 | End: 2021-08-23 | Stop reason: HOSPADM

## 2021-08-23 RX ORDER — ONDANSETRON 2 MG/ML
INJECTION INTRAMUSCULAR; INTRAVENOUS AS NEEDED
Status: DISCONTINUED | OUTPATIENT
Start: 2021-08-23 | End: 2021-08-23 | Stop reason: SURG

## 2021-08-23 RX ORDER — ACETAMINOPHEN AND CODEINE PHOSPHATE 300; 30 MG/1; MG/1
1 TABLET ORAL EVERY 6 HOURS PRN
Status: DISCONTINUED | OUTPATIENT
Start: 2021-08-23 | End: 2021-08-25

## 2021-08-23 RX ORDER — NEOSTIGMINE METHYLSULFATE 1 MG/ML
INJECTION INTRAVENOUS AS NEEDED
Status: DISCONTINUED | OUTPATIENT
Start: 2021-08-23 | End: 2021-08-23 | Stop reason: SURG

## 2021-08-23 RX ORDER — HYDROMORPHONE HYDROCHLORIDE 1 MG/ML
0.8 INJECTION, SOLUTION INTRAMUSCULAR; INTRAVENOUS; SUBCUTANEOUS EVERY 2 HOUR PRN
Status: DISCONTINUED | OUTPATIENT
Start: 2021-08-23 | End: 2021-08-25

## 2021-08-23 RX ORDER — DEXAMETHASONE SODIUM PHOSPHATE 4 MG/ML
VIAL (ML) INJECTION AS NEEDED
Status: DISCONTINUED | OUTPATIENT
Start: 2021-08-23 | End: 2021-08-23 | Stop reason: SURG

## 2021-08-23 RX ORDER — NALOXONE HYDROCHLORIDE 0.4 MG/ML
80 INJECTION, SOLUTION INTRAMUSCULAR; INTRAVENOUS; SUBCUTANEOUS AS NEEDED
Status: DISCONTINUED | OUTPATIENT
Start: 2021-08-23 | End: 2021-08-23 | Stop reason: HOSPADM

## 2021-08-23 RX ORDER — ONDANSETRON 2 MG/ML
4 INJECTION INTRAMUSCULAR; INTRAVENOUS EVERY 6 HOURS PRN
Status: DISCONTINUED | OUTPATIENT
Start: 2021-08-23 | End: 2021-08-25

## 2021-08-23 RX ORDER — CLINDAMYCIN PHOSPHATE 600 MG/50ML
600 INJECTION INTRAVENOUS ONCE
Status: COMPLETED | OUTPATIENT
Start: 2021-08-23 | End: 2021-08-23

## 2021-08-23 RX ORDER — ACETAMINOPHEN AND CODEINE PHOSPHATE 300; 30 MG/1; MG/1
1-2 TABLET ORAL EVERY 6 HOURS PRN
Status: DISCONTINUED | OUTPATIENT
Start: 2021-08-23 | End: 2021-08-23 | Stop reason: CLARIF

## 2021-08-23 RX ORDER — CLINDAMYCIN PHOSPHATE 600 MG/50ML
600 INJECTION INTRAVENOUS EVERY 8 HOURS
Status: COMPLETED | OUTPATIENT
Start: 2021-08-24 | End: 2021-08-24

## 2021-08-23 RX ORDER — ACETAMINOPHEN AND CODEINE PHOSPHATE 300; 30 MG/1; MG/1
2 TABLET ORAL EVERY 6 HOURS PRN
Status: DISCONTINUED | OUTPATIENT
Start: 2021-08-23 | End: 2021-08-25

## 2021-08-23 RX ORDER — HYDROMORPHONE HYDROCHLORIDE 1 MG/ML
1.2 INJECTION, SOLUTION INTRAMUSCULAR; INTRAVENOUS; SUBCUTANEOUS EVERY 2 HOUR PRN
Status: DISCONTINUED | OUTPATIENT
Start: 2021-08-23 | End: 2021-08-25

## 2021-08-23 RX ADMIN — ROCURONIUM BROMIDE 30 MG: 10 INJECTION, SOLUTION INTRAVENOUS at 18:58:00

## 2021-08-23 RX ADMIN — NEOSTIGMINE METHYLSULFATE 3 MG: 1 INJECTION INTRAVENOUS at 20:00:00

## 2021-08-23 RX ADMIN — LIDOCAINE HYDROCHLORIDE 50 MG: 10 INJECTION, SOLUTION EPIDURAL; INFILTRATION; INTRACAUDAL; PERINEURAL at 18:58:00

## 2021-08-23 RX ADMIN — CLINDAMYCIN PHOSPHATE 600 MG: 600 INJECTION INTRAVENOUS at 19:16:00

## 2021-08-23 RX ADMIN — GLYCOPYRROLATE 0.3 MG: 0.2 INJECTION, SOLUTION INTRAMUSCULAR; INTRAVENOUS at 20:00:00

## 2021-08-23 RX ADMIN — ONDANSETRON 4 MG: 2 INJECTION INTRAMUSCULAR; INTRAVENOUS at 19:56:00

## 2021-08-23 RX ADMIN — DEXAMETHASONE SODIUM PHOSPHATE 4 MG: 4 MG/ML VIAL (ML) INJECTION at 19:04:00

## 2021-08-23 RX ADMIN — SODIUM CHLORIDE, SODIUM LACTATE, POTASSIUM CHLORIDE, CALCIUM CHLORIDE: 600; 310; 30; 20 INJECTION, SOLUTION INTRAVENOUS at 18:54:00

## 2021-08-23 NOTE — PROGRESS NOTES
Patient is on her way to OR now. Has been NPO. She does have the left shoulder immobilizer and OR nurse was made aware of shoulder restrictions.

## 2021-08-23 NOTE — ANESTHESIA PREPROCEDURE EVALUATION
PRE-OP EVALUATION    Patient Name: Gabriela Chapa    Admit Diagnosis: Closed fracture of right hip, initial encounter ([facility] [address] Rashad Morales    Pre-op Diagnosis: [facility] Ulysees Monday    RIGHT HIP PINNING    Anesthesia Procedure: RIGHT HIP PINNING (Right Chew Tab, Chew 3 tablets by mouth daily. , Disp: , Rfl: , 8/22/2021 at 0900  hydrocortisone 2.5 % External Cream, Apply 1 Application topically 2 (two) times daily as needed. , Disp: , Rfl: , Past Month at Unknown time  Multiple Vitamins-Minerals (Praneeth Isabel SURGICAL HISTORY      10 breast surg - implants - no cancer  -    • OTHER SURGICAL HISTORY      BSO next year   • OTHER SURGICAL HISTORY  2012    left leg muscle surgery   • REDUCTION LEFT  1970's   • REDUCTION RIGHT  1970's   • THYROIDECTOMY  02/24/2019 Patient understands and agrees to proceed.     Plan/risks discussed with: patient                Present on Admission:  **None**

## 2021-08-23 NOTE — PLAN OF CARE
A/O VSS on room air. IV dilaudid given for right hip pain. NPO since midnight for possible surgery today. Patient verbalized understanding. Plan of care reviewed. Call light within reach. Bed alarm on.

## 2021-08-23 NOTE — CONSULTS
Patient is a 32-year-old white female admitted to the hospital for a right hip fracture. Patient states she was in her bathroom and lost her balance and fell landing on her right hip. Patient was having pain weightbearing on her right leg.   Patient was b

## 2021-08-23 NOTE — PROGRESS NOTES
[facility] Hospitalist Progress Note     [facility]      SUBJECTIVE:  Doing well, on 2L O2 however no SOB, no chest pain, anxiious about her surgery this evening - no lightheadedness no diarrhea, fevers or chills     OBJECTIVE:  Temp:  [97.1 °F (36.2 °C)-9 displaced fracture or dislocation. Normal mineralization. Unremarkable soft tissues. CONCLUSION:  No evidence of acute displaced fracture or dislocation in the right knee.   Dictated by (CST): Esme Harris MD on 8/22/2021 at 6:04 PM     [facility] (TYLENOL) tab 650 mg, 650 mg, Oral, Q6H PRN  HYDROmorphone HCl (DILAUDID) 1 MG/ML injection 0.2 mg, 0.2 mg, Intravenous, Q2H PRN   Or  HYDROmorphone HCl (DILAUDID) 1 MG/ML injection 0.4 mg, 0.4 mg, Intravenous, Q2H PRN   Or  HYDROmorphone HCl (DILAUDID) 1

## 2021-08-23 NOTE — PLAN OF CARE
Pt  is AOx4, on 2L O2 after the narcotics. VS are stable, IVF infusing, voiding freely. Has been NPO since 10am,  Pt c/o lots of cramping in her right leg,  pain relieved by IV pain meds. IS education complete. Ankle pumps encouraged.   Pt instructed

## 2021-08-23 NOTE — INTERVAL H&P NOTE
Pre-op Diagnosis: Hip fracture ([facility] [address] [S72.009A]    The above referenced H&P was reviewed by Xiomara Lenz MD on 8/23/2021, the patient was examined and no significant changes have occurred in the patient's condition since the H&P was performed.   I di

## 2021-08-23 NOTE — H&P
[facility] Hospitalist H&P       CC: Patient presents with:  Fall  Leg or Foot Injury       PCP: Jong Orellana MD    History of Present Illness:    Patient is a 72-year-old female with significant past medical history of anal cancer status post chemo and radiat PSH  Past Surgical History:   Procedure Laterality Date   • APPENDECTOMY     • CATARACT  11/2017   • CHOLECYSTECTOMY  2015   • COLONOSCOPY  2015    normal   • FOOT SURGERY Right 09/11/2018    [place]   • HYSTERECTOMY  1985    for fibroids   • IMPLANT • Lipids Mother    • Other (Other) Mother         carotid surgery   • Heart Disorder Sister         angioplasty   • Lipids Sister    • Hypertension Brother    • Heart Disorder Brother         MI and cardiomyopathy   • Cancer Son         prostate cancer mood/affect          Diagnostic Data:    CBC/Chem  Recent Labs   Lab 08/22/21  1841   WBC 10.3   HGB 14.9   MCV 87.9   .0       Recent Labs   Lab 08/22/21  1841      K 4.6      CO2 27.0   BUN 20*   CREATSERUM 0.76   GLU 90   CA 9.2 healed fracture deformity of the right pubic body, right superior pubic ramus, and right inferior pubic ramus. Osteopenia. Degenerative changes in the partially imaged lower lumbar spine.             CONCLUSION:  Mildly impacted fracture of the right femo

## 2021-08-23 NOTE — OCCUPATIONAL THERAPY NOTE
OT orders received, chart reviewed. Patient anticipated to undergo surgical repair of R hip fracture later today. Will sign off at this time. Please re-order OT post-op with weight-bearing status when patient appropriate.

## 2021-08-23 NOTE — ED PROVIDER NOTES
Patient Seen in: [facility] Emergency Department      History   Patient presents with:  Fall  Leg or Foot Injury    Stated Complaint: hip pain     HPI/Subjective:   HPI    37-year-old female presents emergency room for evaluation of right hip pain s • OTHER SURGICAL HISTORY      10 breast surg - implants - no cancer  -    • OTHER SURGICAL HISTORY      BSO next year   • OTHER SURGICAL HISTORY  2012    left leg muscle surgery   • REDUCTION LEFT  1970's   • REDUCTION RIGHT  1970's   • THYROIDECTOMY  02 tenderness to the right hip. No tenderness or swelling to the right thigh. There is mild tenderness to the right knee without swelling. No tib-fib ankle or foot tenderness on the right. Distal pedal pulse present equal bilaterally.   SKIN: Warm, dry, in femoral block by anesthesia which patient declined. Patient was discussed with the [facility] hospitalist physician. Patient and family requested the [facility] orthopedic service be consulted, discussed with Dr. Venecia Dela Cruz.   Discussed all results with the patient an

## 2021-08-23 NOTE — PHYSICAL THERAPY NOTE
Order rec'd and chart reviewed. Pt scheduled for ORIF later today. Will sign off and await new orders post op.

## 2021-08-23 NOTE — PLAN OF CARE
Assumed care of pt at 2130. Pt AOx4. Daughter at bedside. Anxious. RA. . Fluids infusing per orders. Right hip pain 8/10. Dilaudid IV pain relief. Ortho to see pt in am.  Pt NPO after MN for possible surgery. Will continue to monitor.

## 2021-08-24 LAB
BASOPHILS # BLD AUTO: 0.01 X10(3) UL (ref 0–0.2)
BASOPHILS NFR BLD AUTO: 0.1 %
EOSINOPHIL # BLD AUTO: 0.01 X10(3) UL (ref 0–0.7)
EOSINOPHIL NFR BLD AUTO: 0.1 %
ERYTHROCYTE [DISTWIDTH] IN BLOOD BY AUTOMATED COUNT: 13.2 %
HCT VFR BLD AUTO: 40.9 %
HGB BLD-MCNC: 12.8 G/DL
IMM GRANULOCYTES # BLD AUTO: 0.05 X10(3) UL (ref 0–1)
IMM GRANULOCYTES NFR BLD: 0.6 %
INR BLD: 1.07 (ref 0.89–1.11)
LYMPHOCYTES # BLD AUTO: 0.45 X10(3) UL (ref 1–4)
LYMPHOCYTES NFR BLD AUTO: 5.6 %
MCH RBC QN AUTO: 28.4 PG (ref 26–34)
MCHC RBC AUTO-ENTMCNC: 31.3 G/DL (ref 31–37)
MCV RBC AUTO: 90.9 FL
MONOCYTES # BLD AUTO: 0.29 X10(3) UL (ref 0.1–1)
MONOCYTES NFR BLD AUTO: 3.6 %
NEUTROPHILS # BLD AUTO: 7.16 X10 (3) UL (ref 1.5–7.7)
NEUTROPHILS # BLD AUTO: 7.16 X10(3) UL (ref 1.5–7.7)
NEUTROPHILS NFR BLD AUTO: 90 %
PLATELET # BLD AUTO: 197 10(3)UL (ref 150–450)
PSA SERPL DL<=0.01 NG/ML-MCNC: 14.1 SECONDS (ref 12.2–14.5)
RBC # BLD AUTO: 4.5 X10(6)UL
WBC # BLD AUTO: 8 X10(3) UL (ref 4–11)

## 2021-08-24 PROCEDURE — S0077 INJECTION, CLINDAMYCIN PHOSP: HCPCS | Performed by: ORTHOPAEDIC SURGERY

## 2021-08-24 PROCEDURE — 97530 THERAPEUTIC ACTIVITIES: CPT

## 2021-08-24 PROCEDURE — 85025 COMPLETE CBC W/AUTO DIFF WBC: CPT | Performed by: ORTHOPAEDIC SURGERY

## 2021-08-24 PROCEDURE — 97165 OT EVAL LOW COMPLEX 30 MIN: CPT

## 2021-08-24 PROCEDURE — 97162 PT EVAL MOD COMPLEX 30 MIN: CPT

## 2021-08-24 PROCEDURE — 85610 PROTHROMBIN TIME: CPT | Performed by: ORTHOPAEDIC SURGERY

## 2021-08-24 NOTE — PLAN OF CARE
Arrived from PACU 2145  Problem: Right hip fracture   Data: Ax04. No tele. , 2L NC overnight 02 sats 96%. Afebrile. Pain on right hip 3/10. Purewick. Regular diet with carb controlled. IVF @ 100/ml hr. SCDS. Fall risk. WCTM  Intervention: Cliindamycin , IV call for assistance with activity based on assessment  - Modify environment to reduce risk of injury  - Provide assistive devices as appropriate  - Consider OT/PT consult to assist with strengthening/mobility  - Encourage toileting schedule  Outcome: Progr

## 2021-08-24 NOTE — ANESTHESIA POSTPROCEDURE EVALUATION
[address] Patient Status:  Inpatient   Age/Gender 80year old female MRN [id number]   Location [facility] Attending Kay Khoury MD   Hosp Day # 1 PCP Ramirez Marinelli MD       Anesthesia Post-op Note

## 2021-08-24 NOTE — CM/SW NOTE
08/24/21 1300   CM/SW Referral Data   Referral Source Social Work (self-referral)   Reason for Referral Discharge planning   Informant Patient   Patient Info   Patient's Current Mental Status at Time of Assessment Alert;Oriented   Patient's Home Environ

## 2021-08-24 NOTE — OCCUPATIONAL THERAPY NOTE
OCCUPATIONAL THERAPY EVALUATION - INPATIENT     Room Number: 351/351-A  Evaluation Date: 8/24/2021  Type of Evaluation: Initial  Presenting Problem: s/p R hip pinning 8/23/21    Physician Order: IP Consult to Occupational Therapy  Reason for Therapy: ADL/I 2012    left leg muscle surgery   • REDUCTION LEFT  1970's   • REDUCTION RIGHT  1970's   • THYROIDECTOMY  02/24/2019    partial        OCCUPATIONAL PROFILE    HOME SITUATION  Type of Home: Condo  Home Layout: One level  Lives With: Daughter (daughter works [employer]       ACTIVITIES OF DAILY LIVING ASSESSMENT  AM-PAC ‘6-Clicks’ Inpatient Daily Activity Short Form  How much help from another person does the patient currently need…  -   Putting on and taking off regular lower body cl met;Call light within reach; All patient questions and concerns addressed; Alarm set; Other (Comment) (declined SCDs)    ASSESSMENT   Patient is a 80year old  female admitted 8/22/2021 for fall with R hip fracture, s/p R hip pinning 8/23/21.  Complete medical of tasks    Clinical Decision Making LOW - Analysis of occupational profile, problem-focused assessments, limited treatment options    Overall Complexity LOW     OT Discharge Recommendations: Sub-acute rehabilitation  OT Device Recommendations: 3-in-1 comm

## 2021-08-24 NOTE — PHYSICAL THERAPY NOTE
PHYSICAL THERAPY HIP EVALUATION - INPATIENT     Room Number: 351/351-A  Evaluation Date: 8/24/2021  Type of Evaluation: Initial  Physician Order: PT Eval and Treat    Presenting Problem: s/p pinning R hip on 8/23/21  Reason for Therapy: Mobility Dysfunctio IMPLANT LEFT     • IMPLANT RIGHT     •      • OTHER SURGICAL HISTORY      10 breast surg - implants - no cancer  -    • OTHER SURGICAL HISTORY      BSO next year   • OTHER SURGICAL HISTORY      left leg muscle surgery   • REDUCTION LEFT mechanics;Repositioning; Other (Comment) (maintain 25% WB on RLE, pain meds; ice)    COGNITION  · Overall Cognitive Status:  Impaired  · Arousal/Alertness:  appropriate responses to stimuli  · Attention Span:  attends with cues to redirect  · Orientation Melita Mckenna wheelchair, bedside commode, etc.): A Little   -   Moving from lying on back to sitting on the side of the bed?: A Little   How much help from another person does the patient currently need. ..   -   Moving to and from a bed to a chair (including a wheelcha until outpt PT tells her that she safely can take some steps because she is able to do so maintaining 25% WB on RLE. Pt is agreeable to going home performing pivot transfers with the compliance of the 25% WB on RLE, and working with outpt PT.  States someon PT to address the above deficits to assist patient in returning to prior level of function.     DISCHARGE RECOMMENDATIONS  PT Discharge Recommendations: Sub-acute rehabilitation (if refuses, home would need 24 hour care/supervision)    PLAN  PT Treatment Pl

## 2021-08-24 NOTE — PROGRESS NOTES
[facility]    Progress Note    Camryn Mohr Patient Status:  Inpatient    1940 MRN [id number]   [facility] 3SW-A Attending Morgan Avery MD   Hosp Day # 2 PCP Jensen Banks MD     HPI/Subjective:   Camryn Mohr is a(n) 80 ye Finalized by (CST): Cyd Kehr, MD on 8/23/2021 at 8:05 PM       XR [address] [facility],[address] 2 OR 3 VIEWS, RIGHT (CPT=[id number])    Result Date: 8/22/2021  CONCLUSION:  Mildly impacted fracture of the right femoral neck noted.   Chronic incompletely healed fractu

## 2021-08-24 NOTE — PLAN OF CARE
Pt is A/O X4, forgetful at times. Dentures at bedside. Maintaining O2 sat WNL on room air, 2L for sleep. IS-1000. Refused scds for the shift, educated pt to have them on during the night, pt verbalized understanding. Xarelto. Last bm today.  Voids via purew needs  - Identify cognitive and physical deficits and behaviors that affect risk of falls.   - [place] fall precautions as indicated by assessment.  - Educate pt/family on patient safety including physical limitations  - Instruct pt to call for assistance

## 2021-08-24 NOTE — ANESTHESIA PROCEDURE NOTES
Airway  Date/Time: 8/23/2021 6:59 PM  Urgency: elective      General Information and Staff    Patient location during procedure: OR  Anesthesiologist: Meme Hook MD  Performed: anesthesiologist     Indications and Patient Condition  Indications for

## 2021-08-24 NOTE — OPERATIVE REPORT
[facility]    PATIENT'S NAME: Lemuel Borja   ATTENDING PHYSICIAN: Dallin Pisano MD   OPERATING PHYSICIAN: Jasmin Rodriguez M.D.    PATIENT ACCOUNT#:   [de-identified]    LOCATION:  [address] [place] [address]  MEDICAL RECORD #:   [id number]       DATE OF BIRTH:  08/ recovery room in stable condition.      Dictated By Arleth Jay M.D.  d: 08/23/2021 20:16:33  t: 08/24/2021 36:89:18  [facility] [id number]  [place]/

## 2021-08-24 NOTE — BRIEF OP NOTE
Pre-Operative Diagnosis: Hip fracture ([facility] [address] [S72.009A]     Post-Operative Diagnosis: [facility] [S72.009A]      Procedure Performed:   RIGHT HIP PINNING    Surgeon(s) and Role:     Carson Zaldivar MD - Primary    Assistant(s):   none     Komal

## 2021-08-24 NOTE — PROGRESS NOTES
[facility] Hospitalist Progress Note     [facility]      SUBJECTIVE:    Doing well post op, trying nto work with PT s/p Right hip pinning         OBJECTIVE:  Temp:  [97.6 °F (36.4 °C)-98.7 °F (37.1 °C)] 98.6 °F (37 °C)  Pulse:  [63-95] 76  Resp:  [14-25] 2 Unremarkable soft tissues. CONCLUSION:  No evidence of acute displaced fracture or dislocation in the right knee.   Dictated by (CST): Lianne Patel MD on 8/22/2021 at 6:04 PM     Finalized by (CST): Lianne Patel MD on 8/22/2021 at 6:05 PM PRN   Or  acetaminophen-codeine (TYLENOL #3) 300-30 MG tab 2 tablet, 2 tablet, Oral, Q6H PRN  [MAR Hold] morphINE sulfate (PF) 4 MG/ML injection 4 mg, 4 mg, Intravenous, Q30 Min PRN  Albuterol Sulfate  (90 Base) MCG/ACT inhaler 2 puff, 2 puff, Inhal

## 2021-08-25 VITALS
OXYGEN SATURATION: 97 % | HEIGHT: 59 IN | RESPIRATION RATE: 20 BRPM | DIASTOLIC BLOOD PRESSURE: 69 MMHG | SYSTOLIC BLOOD PRESSURE: 128 MMHG | HEART RATE: 68 BPM | TEMPERATURE: 98 F | BODY MASS INDEX: 26.61 KG/M2 | WEIGHT: 132 LBS

## 2021-08-25 LAB
BASOPHILS # BLD AUTO: 0.05 X10(3) UL (ref 0–0.2)
BASOPHILS NFR BLD AUTO: 0.7 %
EOSINOPHIL # BLD AUTO: 0.39 X10(3) UL (ref 0–0.7)
EOSINOPHIL NFR BLD AUTO: 5.3 %
ERYTHROCYTE [DISTWIDTH] IN BLOOD BY AUTOMATED COUNT: 13.3 %
HCT VFR BLD AUTO: 37.3 %
HGB BLD-MCNC: 11.7 G/DL
IMM GRANULOCYTES # BLD AUTO: 0.02 X10(3) UL (ref 0–1)
IMM GRANULOCYTES NFR BLD: 0.3 %
INR BLD: 1.11 (ref 0.89–1.11)
LYMPHOCYTES # BLD AUTO: 1.39 X10(3) UL (ref 1–4)
LYMPHOCYTES NFR BLD AUTO: 19 %
MCH RBC QN AUTO: 28.8 PG (ref 26–34)
MCHC RBC AUTO-ENTMCNC: 31.4 G/DL (ref 31–37)
MCV RBC AUTO: 91.9 FL
MONOCYTES # BLD AUTO: 0.5 X10(3) UL (ref 0.1–1)
MONOCYTES NFR BLD AUTO: 6.8 %
NEUTROPHILS # BLD AUTO: 4.97 X10 (3) UL (ref 1.5–7.7)
NEUTROPHILS # BLD AUTO: 4.97 X10(3) UL (ref 1.5–7.7)
NEUTROPHILS NFR BLD AUTO: 67.9 %
PLATELET # BLD AUTO: 185 10(3)UL (ref 150–450)
PSA SERPL DL<=0.01 NG/ML-MCNC: 14.5 SECONDS (ref 12.2–14.5)
RBC # BLD AUTO: 4.06 X10(6)UL
WBC # BLD AUTO: 7.3 X10(3) UL (ref 4–11)

## 2021-08-25 PROCEDURE — 97116 GAIT TRAINING THERAPY: CPT

## 2021-08-25 PROCEDURE — 97530 THERAPEUTIC ACTIVITIES: CPT

## 2021-08-25 PROCEDURE — 85610 PROTHROMBIN TIME: CPT | Performed by: ORTHOPAEDIC SURGERY

## 2021-08-25 PROCEDURE — 85025 COMPLETE CBC W/AUTO DIFF WBC: CPT | Performed by: ORTHOPAEDIC SURGERY

## 2021-08-25 RX ORDER — ACETAMINOPHEN AND CODEINE PHOSPHATE 300; 30 MG/1; MG/1
1 TABLET ORAL EVERY 6 HOURS PRN
Qty: 40 TABLET | Refills: 0 | Status: SHIPPED | OUTPATIENT
Start: 2021-08-25 | End: 2021-09-16

## 2021-08-25 RX ORDER — POLYETHYLENE GLYCOL 3350 17 G/17G
17 POWDER, FOR SOLUTION ORAL DAILY PRN
Qty: 30 EACH | Refills: 0 | Status: SHIPPED | OUTPATIENT
Start: 2021-08-25 | End: 2022-01-06 | Stop reason: ALTCHOICE

## 2021-08-25 RX ORDER — BISACODYL 10 MG
10 SUPPOSITORY, RECTAL RECTAL
Qty: 30 SUPPOSITORY | Refills: 0 | Status: SHIPPED | OUTPATIENT
Start: 2021-08-25 | End: 2022-01-06 | Stop reason: ALTCHOICE

## 2021-08-25 NOTE — PLAN OF CARE
Daughter at bedside. Verbal and written discharge instructions reviewed. Both verbalized understanding. Discharged via wheel chair, accompanied by O/S staff. Prescriptions for pain medication & anticoagulant electronically prescribed to her pharmacy.

## 2021-08-25 NOTE — PROGRESS NOTES
Doing well today with mild pain. Tolerated therapy well. Impression is that of normal post op  Plan is for discharge today. Follow up in 2 weeks. Call if any problems. Tly#3 for pain.  Xarelto for DVT prophylaxis

## 2021-08-25 NOTE — PHYSICAL THERAPY NOTE
PHYSICAL THERAPY TREATMENT NOTE - INPATIENT    Room Number: 351/351-A     Session: 1     Number of Visits to Meet Established Goals: 4    Presenting Problem: s/p pinning R hip on 8/23/21    ASSESSMENT     Pt seen this AM for bed mobility, transfers, gai alarm    WEIGHT BEARING RESTRICTION  Weight Bearing Restriction: R lower extremity        R Lower Extremity: Partial Weight Bearing 25%       PAIN ASSESSMENT   Ratin  Location: RLE  Management Techniques: Activity promotion; Body mechanics;Repositioning RLE, RW usage, safety, HEP      THERAPEUTIC EXERCISES  Lower Extremity Alternating marching  Ankle pumps  Knee extension     Upper Extremity Elbow flex/ext     Position Sitting     Repetitions   10   Sets   1     Patient End of Session: Up in chair;Needs m

## 2021-08-25 NOTE — CM/SW NOTE
08/25/21 1400   Discharge disposition   Expected discharge disposition Home or [facility]   Discharge transportation Private car   Gave choice list. Pt selected [facility]. Daughter to transport home.  Pt declined [facility]

## 2021-08-25 NOTE — DISCHARGE SUMMARY
General Medicine Discharge Summary     Patient ID:  Ry Diez  80year old  8/13/1940    Admit date: 8/22/2021    Discharge date and time: 08/25/2021     Attending Physician: Tonia Cogan, MD     Primary Care Physician: Terri Shrestha MD     Reason cancer status post chemoradiation     #History of pulmonary emphysema  -off O2 - on home inhalers     Consults: IP CONSULT TO ORTHOPEDIC SURGERY  IP CONSULT TO HOSPITALIST  IP CONSULT TO SOCIAL WORK  IP CONSULT TO SOCIAL WORK    Operative Procedures: Proce

## 2021-08-25 NOTE — PLAN OF CARE
A&O x 4, forgetful. VSS. On RA. Right hip pain well controlled with T3 PRN. Denies N/T. Coverlet dressing to right hip C/D/I. Up with min assist and walker to bedside commode. 25%WB to RLE. Refusing SCD's despite education/On Xarelto.  Reviewed POC, pain ma

## 2021-08-25 NOTE — CM/SW NOTE
SW called [facility] they stated their home therapist is off until 8/31. Pt agreeable to use another agency. Pt stated she had a bad experience in the past with [facility]. [facility] once it is available.  Pt stated she is picking up a wh

## 2021-08-27 ENCOUNTER — TELEPHONE (OUTPATIENT)
Dept: ORTHOPEDICS CLINIC | Facility: CLINIC | Age: 81
End: 2021-08-27

## 2021-08-27 DIAGNOSIS — Z87.81 S/P RIGHT HIP FRACTURE: Primary | ICD-10-CM

## 2021-08-27 NOTE — TELEPHONE ENCOUNTER
Patient called stating that she was having nose bleeds that she feels is related to the Xarelto. She wanted to know if she can substitute it for 2 Asprin a day. I spoke with eusebia and she instructed to have patient take 1 BID.  I also let her know to have

## 2021-08-30 ENCOUNTER — HOSPITAL ENCOUNTER (OUTPATIENT)
Dept: GENERAL RADIOLOGY | Facility: HOSPITAL | Age: 81
Discharge: HOME | End: 2021-08-30
Attending: ORTHOPAEDIC SURGERY
Payer: MEDICARE

## 2021-08-30 DIAGNOSIS — Z87.81 S/P RIGHT HIP FRACTURE: ICD-10-CM

## 2021-08-30 PROCEDURE — 73502 X-RAY EXAM HIP UNI 2-3 VIEWS: CPT | Performed by: ORTHOPAEDIC SURGERY

## 2021-08-31 ENCOUNTER — TELEPHONE (OUTPATIENT)
Dept: ORTHOPEDICS CLINIC | Facility: CLINIC | Age: 81
End: 2021-08-31

## 2021-08-31 NOTE — TELEPHONE ENCOUNTER
Please call patient regarding results of her right hip xray from yesterday.     Future Appointments   Date Time Provider Luis Enrique Murphy   9/7/2021  2:00 PM Reshma Armstrong MD [facility]

## 2021-08-31 NOTE — TELEPHONE ENCOUNTER
Reviewed xrays with Dr. Isiah Sunshine and called patient to review the results. She states overall she is doing well and TTWB. Pain is improving every day but she does have some difficulty at night. Otherwise no complaints.   I explained that the fracture is

## 2021-08-31 NOTE — TELEPHONE ENCOUNTER
8/30/21 XR HIP W OR WO PELVIS 2 OR 3 VIEWS, RIGHT  FINDINGS: Angela Brochure is been interim placement of 2 threaded screws through the right femoral neck and head transfixing an impacted subcapital right femoral neck fracture.  There is mild varus deformity and pe

## 2021-09-07 ENCOUNTER — OFFICE VISIT (OUTPATIENT)
Dept: ORTHOPEDICS CLINIC | Facility: CLINIC | Age: 81
End: 2021-09-07
Payer: MEDICARE

## 2021-09-07 VITALS — WEIGHT: 135 LBS | HEIGHT: 59 IN | BODY MASS INDEX: 27.21 KG/M2

## 2021-09-07 DIAGNOSIS — Z48.89 AFTERCARE FOLLOWING SURGERY: Primary | ICD-10-CM

## 2021-09-07 PROCEDURE — 1111F DSCHRG MED/CURRENT MED MERGE: CPT | Performed by: ORTHOPAEDIC SURGERY

## 2021-09-07 PROCEDURE — 99024 POSTOP FOLLOW-UP VISIT: CPT | Performed by: ORTHOPAEDIC SURGERY

## 2021-09-11 NOTE — PROGRESS NOTES
Patient is a 81-year-old white female now about 2 weeks out from her right hip pinning. Patient's complained of some mild soreness. Otherwise doing fairly well. Patient's daughter is with her today.     Patient's exam shows her to have well-healed surgic

## 2021-09-16 RX ORDER — ACETAMINOPHEN AND CODEINE PHOSPHATE 300; 30 MG/1; MG/1
1 TABLET ORAL 2 TIMES DAILY PRN
Qty: 40 TABLET | Refills: 0 | Status: SHIPPED | OUTPATIENT
Start: 2021-09-16 | End: 2022-01-06

## 2021-09-21 ENCOUNTER — OFFICE VISIT (OUTPATIENT)
Dept: ORTHOPEDICS CLINIC | Facility: CLINIC | Age: 81
End: 2021-09-21
Payer: MEDICARE

## 2021-09-21 ENCOUNTER — HOSPITAL ENCOUNTER (OUTPATIENT)
Dept: GENERAL RADIOLOGY | Facility: HOSPITAL | Age: 81
Discharge: HOME | End: 2021-09-21
Attending: ORTHOPAEDIC SURGERY
Payer: MEDICARE

## 2021-09-21 VITALS — HEIGHT: 59 IN | WEIGHT: 135 LBS | BODY MASS INDEX: 27.21 KG/M2

## 2021-09-21 DIAGNOSIS — S72.001D HIP FRACTURE REQUIRING OPERATIVE REPAIR, RIGHT, CLOSED, WITH ROUTINE HEALING, SUBSEQUENT ENCOUNTER: ICD-10-CM

## 2021-09-21 DIAGNOSIS — S72.001D HIP FRACTURE REQUIRING OPERATIVE REPAIR, RIGHT, CLOSED, WITH ROUTINE HEALING, SUBSEQUENT ENCOUNTER: Primary | ICD-10-CM

## 2021-09-21 PROCEDURE — 73502 X-RAY EXAM HIP UNI 2-3 VIEWS: CPT | Performed by: ORTHOPAEDIC SURGERY

## 2021-09-21 PROCEDURE — 99024 POSTOP FOLLOW-UP VISIT: CPT | Performed by: ORTHOPAEDIC SURGERY

## 2021-09-21 NOTE — PROGRESS NOTES
Patient is a 53-year-old white female now about 4 weeks out from her pinning of her right hip fracture. Patient has minimal complaints. Patient states she is walking with crutches. Patient states that overall she has no significant pain.     Patient is e

## 2021-09-22 ENCOUNTER — TELEPHONE (OUTPATIENT)
Dept: ORTHOPEDICS CLINIC | Facility: CLINIC | Age: 81
End: 2021-09-22

## 2021-09-22 NOTE — TELEPHONE ENCOUNTER
Please call patient with right hip xray results. Xray was done after her appointment yesterday. Please advise if she can she bear weight on her right leg for PT sessions. She has scheduled her 4 week follow up.     Future Appointments   Date Time Provider D

## 2021-09-23 ENCOUNTER — TELEPHONE (OUTPATIENT)
Dept: ORTHOPEDICS CLINIC | Facility: CLINIC | Age: 81
End: 2021-09-23

## 2021-09-23 NOTE — TELEPHONE ENCOUNTER
Can she put weight on foot? Please advise. .    Future Appointments   Date Time Provider Luis Enrique Katherine   10/19/2021  2:00 PM Marixa Velasquez MD [facility]

## 2021-09-23 NOTE — TELEPHONE ENCOUNTER
Patient inquiring about WB status. Please advise.  No notation in last office note  LOV 9/21/21  Impression is that of normal healing status post right hip pinning for femoral neck fracture.     Recommendation is to go ahead with x-rays of her right hip tod

## 2021-09-23 NOTE — TELEPHONE ENCOUNTER
Mickie Vuong PA-C   I spoke with Dr. Jeanine Salazar and he states she can be weight bearing as tolerated  with the walker/crutches until she follows up again. Robert Revering you!

## 2021-10-05 ENCOUNTER — TELEPHONE (OUTPATIENT)
Dept: ORTHOPEDICS CLINIC | Facility: CLINIC | Age: 81
End: 2021-10-05

## 2021-10-05 DIAGNOSIS — S72.001D HIP FRACTURE REQUIRING OPERATIVE REPAIR, RIGHT, CLOSED, WITH ROUTINE HEALING, SUBSEQUENT ENCOUNTER: Primary | ICD-10-CM

## 2021-10-05 NOTE — TELEPHONE ENCOUNTER
Patient has a post op appointment scheduled on 10/29 with Dr. Kezia Phillip. Patient was advised to get imaging prior to appt but unable to schedule since imaging has not been ordered. Please call patient was imaging has been entered.

## 2021-10-11 ENCOUNTER — MED REC SCAN ONLY (OUTPATIENT)
Dept: ORTHOPEDICS CLINIC | Facility: CLINIC | Age: 81
End: 2021-10-11

## 2021-10-13 ENCOUNTER — TELEPHONE (OUTPATIENT)
Dept: ORTHOPEDICS CLINIC | Facility: CLINIC | Age: 81
End: 2021-10-13

## 2021-10-13 DIAGNOSIS — S72.001D HIP FRACTURE REQUIRING OPERATIVE REPAIR, RIGHT, CLOSED, WITH ROUTINE HEALING, SUBSEQUENT ENCOUNTER: Primary | ICD-10-CM

## 2021-10-13 DIAGNOSIS — Z48.89 AFTERCARE FOLLOWING SURGERY: ICD-10-CM

## 2021-10-13 NOTE — TELEPHONE ENCOUNTER
Spoke to [employer] physical therapist from [facility], who states that she has been working with patient, states that patients progress has been very slow. Does not think pt is doing her home exercises. Patient has been reporting tenderness over her hip area.

## 2021-10-13 NOTE — TELEPHONE ENCOUNTER
Patients at home physical therapist Camilo Rojas is requesting a call back in regards to patients lack of progress.

## 2021-10-28 ENCOUNTER — HOSPITAL ENCOUNTER (OUTPATIENT)
Dept: GENERAL RADIOLOGY | Facility: HOSPITAL | Age: 81
Discharge: HOME | End: 2021-10-28
Attending: ORTHOPAEDIC SURGERY
Payer: MEDICARE

## 2021-10-28 DIAGNOSIS — S72.001D HIP FRACTURE REQUIRING OPERATIVE REPAIR, RIGHT, CLOSED, WITH ROUTINE HEALING, SUBSEQUENT ENCOUNTER: ICD-10-CM

## 2021-10-28 PROCEDURE — 73502 X-RAY EXAM HIP UNI 2-3 VIEWS: CPT | Performed by: ORTHOPAEDIC SURGERY

## 2021-10-29 ENCOUNTER — OFFICE VISIT (OUTPATIENT)
Dept: ORTHOPEDICS CLINIC | Facility: CLINIC | Age: 81
End: 2021-10-29
Payer: MEDICARE

## 2021-10-29 VITALS — WEIGHT: 135 LBS | BODY MASS INDEX: 27.21 KG/M2 | HEIGHT: 59 IN

## 2021-10-29 DIAGNOSIS — S72.001D HIP FRACTURE REQUIRING OPERATIVE REPAIR, RIGHT, CLOSED, WITH ROUTINE HEALING, SUBSEQUENT ENCOUNTER: Primary | ICD-10-CM

## 2021-10-29 DIAGNOSIS — Z48.89 AFTERCARE FOLLOWING SURGERY: ICD-10-CM

## 2021-10-29 PROCEDURE — 99024 POSTOP FOLLOW-UP VISIT: CPT | Performed by: ORTHOPAEDIC SURGERY

## 2021-10-29 RX ORDER — HYDROCODONE BITARTRATE AND ACETAMINOPHEN 5; 325 MG/1; MG/1
1 TABLET ORAL 2 TIMES DAILY PRN
Qty: 25 TABLET | Refills: 0 | Status: SHIPPED | OUTPATIENT
Start: 2021-10-29 | End: 2021-11-03

## 2021-10-30 RX ORDER — ACETAMINOPHEN AND CODEINE PHOSPHATE 300; 30 MG/1; MG/1
1 TABLET ORAL 2 TIMES DAILY PRN
Qty: 30 TABLET | Refills: 0 | Status: SHIPPED | OUTPATIENT
Start: 2021-10-30 | End: 2021-12-13

## 2021-10-30 NOTE — PROGRESS NOTES
Patient is a 46-year-old white female here for follow-up. It is now been approximately 8 weeks since she had the pinning of a right hip fracture. Patient's daughter is with her as well. Patient states she is feeling better.   She is ambulating with crutc

## 2021-11-01 RX ORDER — ACETAMINOPHEN AND CODEINE PHOSPHATE 300; 30 MG/1; MG/1
TABLET ORAL
Qty: 40 TABLET | Refills: 0 | OUTPATIENT
Start: 2021-11-01

## 2021-11-10 ENCOUNTER — MED REC SCAN ONLY (OUTPATIENT)
Dept: ORTHOPEDICS CLINIC | Facility: CLINIC | Age: 81
End: 2021-11-10

## 2021-11-16 ENCOUNTER — TELEPHONE (OUTPATIENT)
Dept: ORTHOPEDICS CLINIC | Facility: CLINIC | Age: 81
End: 2021-11-16

## 2021-11-16 NOTE — TELEPHONE ENCOUNTER
Attempted to reach patient to reschedule appt, no answer, [address] office- please overbook pt with another postop appt when pt returns call.  Needs to be seen due to being post op

## 2021-11-16 NOTE — TELEPHONE ENCOUNTER
Patient cancelled 2 month post op appointment on 11/19 with Dr. Alison Albright and is looking to reschedule. Soonest availability showing is 12/22, should patient be seen sooner?

## 2021-11-23 ENCOUNTER — TELEPHONE (OUTPATIENT)
Dept: ORTHOPEDICS CLINIC | Facility: CLINIC | Age: 81
End: 2021-11-23

## 2021-11-23 NOTE — TELEPHONE ENCOUNTER
Patient cancelled appt. on 11/24 for post-op, she would like to set an appt. Sooner. Please advise. Thanks.     Patient can be reached at [phone]

## 2021-11-23 NOTE — TELEPHONE ENCOUNTER
Spoke to patient who states she had to reschedule 2 of her post op appts due to having to follow up with her cardiologist. States she was placed on a heart monitor. Appointment rescheduled.  Patient verbalized agreement to appt date and time  Future Appoint

## 2021-12-01 ENCOUNTER — HOSPITAL ENCOUNTER (OUTPATIENT)
Dept: GENERAL RADIOLOGY | Age: 81
Discharge: HOME | End: 2021-12-01
Attending: ORTHOPAEDIC SURGERY
Payer: MEDICARE

## 2021-12-01 ENCOUNTER — OFFICE VISIT (OUTPATIENT)
Dept: ORTHOPEDICS CLINIC | Facility: CLINIC | Age: 81
End: 2021-12-01
Payer: MEDICARE

## 2021-12-01 VITALS — BODY MASS INDEX: 25.6 KG/M2 | HEIGHT: 59 IN | WEIGHT: 127 LBS

## 2021-12-01 DIAGNOSIS — M25.551 HIP PAIN, RIGHT: Primary | ICD-10-CM

## 2021-12-01 DIAGNOSIS — Z87.81 S/P RIGHT HIP FRACTURE: ICD-10-CM

## 2021-12-01 DIAGNOSIS — Z48.89 AFTERCARE FOLLOWING SURGERY: ICD-10-CM

## 2021-12-01 DIAGNOSIS — M25.551 HIP PAIN, RIGHT: ICD-10-CM

## 2021-12-01 PROCEDURE — 73502 X-RAY EXAM HIP UNI 2-3 VIEWS: CPT | Performed by: ORTHOPAEDIC SURGERY

## 2021-12-01 PROCEDURE — 99213 OFFICE O/P EST LOW 20 MIN: CPT | Performed by: ORTHOPAEDIC SURGERY

## 2021-12-01 NOTE — PROGRESS NOTES
Patient is a 44-year-old white female here for follow-up on her right hip. Patient had the pinning of a femoral neck fracture. This is now about 3 to 3-1/2 months out from the surgery. Patient is doing okay but she still having some pain.   Also some inc

## 2021-12-13 DIAGNOSIS — S72.001D HIP FRACTURE REQUIRING OPERATIVE REPAIR, RIGHT, CLOSED, WITH ROUTINE HEALING, SUBSEQUENT ENCOUNTER: ICD-10-CM

## 2021-12-13 DIAGNOSIS — Z48.89 AFTERCARE FOLLOWING SURGERY: ICD-10-CM

## 2021-12-13 RX ORDER — ACETAMINOPHEN AND CODEINE PHOSPHATE 300; 30 MG/1; MG/1
1 TABLET ORAL 2 TIMES DAILY PRN
Qty: 30 TABLET | Refills: 0 | Status: SHIPPED | OUTPATIENT
Start: 2021-12-13

## 2021-12-13 NOTE — TELEPHONE ENCOUNTER
Patient saw her cardiologist Dr. Sohail Lopez 12/821 and he said that surgery would be ok. Dr. Sohail Lopez said that you were an excellent doctor.

## 2021-12-13 NOTE — TELEPHONE ENCOUNTER
Spoke with patient who stated that her pain is elevated again and would like a refill on her tylenol #3 medication. She is unable to bear weight on her right hip due to the pain. Pt reports 8/10.  Pt aware that Dr. Bryon Atwood team will be made aware of this

## 2021-12-13 NOTE — TELEPHONE ENCOUNTER
Patient calling wanting to leave message with . Pt states she's having pain in her right hip and is ready to have surgery. Pt is also requesting pain medication Tylenol nothing stronger.  Please advise

## 2021-12-29 ENCOUNTER — OFFICE VISIT (OUTPATIENT)
Dept: ORTHOPEDICS CLINIC | Facility: CLINIC | Age: 81
End: 2021-12-29
Payer: MEDICARE

## 2021-12-29 ENCOUNTER — HOSPITAL ENCOUNTER (OUTPATIENT)
Dept: GENERAL RADIOLOGY | Age: 81
Discharge: HOME | End: 2021-12-29
Attending: ORTHOPAEDIC SURGERY
Payer: MEDICARE

## 2021-12-29 VITALS — WEIGHT: 127 LBS | BODY MASS INDEX: 25.6 KG/M2 | HEIGHT: 59 IN

## 2021-12-29 DIAGNOSIS — S72.001K CLOSED DISPLACED FRACTURE OF RIGHT FEMORAL NECK WITH NONUNION: ICD-10-CM

## 2021-12-29 DIAGNOSIS — S72.001K CLOSED DISPLACED FRACTURE OF RIGHT FEMORAL NECK WITH NONUNION: Primary | ICD-10-CM

## 2021-12-29 PROCEDURE — 99214 OFFICE O/P EST MOD 30 MIN: CPT | Performed by: ORTHOPAEDIC SURGERY

## 2021-12-29 PROCEDURE — 73502 X-RAY EXAM HIP UNI 2-3 VIEWS: CPT | Performed by: ORTHOPAEDIC SURGERY

## 2021-12-29 NOTE — PROGRESS NOTES
Patient is a 70-year-old white female here for follow-up on her right hip. Patient had the pinning of a femoral neck fracture approximately 3 and half months ago.   Patient was doing well initially but then developed some increased pain associated with the

## 2021-12-30 ENCOUNTER — TELEPHONE (OUTPATIENT)
Dept: ORTHOPEDICS CLINIC | Facility: CLINIC | Age: 81
End: 2021-12-30

## 2021-12-30 DIAGNOSIS — S72.001K CLOSED DISPLACED FRACTURE OF RIGHT FEMORAL NECK WITH NONUNION: Primary | ICD-10-CM

## 2021-12-30 NOTE — TELEPHONE ENCOUNTER
Surgeon: Dr. Esther Mccormack    Date of Surgery: 1/31/2022       Post Op Appt: 2/15/2022    Facility: [facility]    Inpatient or Outpatient: Outpatient    Surgical Assistant: yes Anastasia Osman from [facility]    Preadmission Testing Ordered:  yes Hospital to order    Pre-Op Clearance Requested:   PCP: yes   Cardiac: yes   Pulmonary: no   Dental: no   Other: no    DME: yes    Rehab Services Ordered:   Home Health: no   Outpatient: no    Is this work comp related? no    Prior Authorization: pending    Disability Paperwork: no    On [place] Calendar: yes    Notes:  Yes

## 2022-01-10 ENCOUNTER — LABORATORY ENCOUNTER (OUTPATIENT)
Dept: LAB | Facility: HOSPITAL | Age: 82
End: 2022-01-10
Attending: ORTHOPAEDIC SURGERY
Payer: MEDICARE

## 2022-01-10 DIAGNOSIS — S72.001K CLOSED DISPLACED FRACTURE OF RIGHT FEMORAL NECK WITH NONUNION: ICD-10-CM

## 2022-01-10 LAB
ALBUMIN SERPL-MCNC: 3.6 G/DL (ref 3.4–5)
ALBUMIN/GLOB SERPL: 1 {RATIO} (ref 1–2)
ALP LIVER SERPL-CCNC: 86 U/L
ALT SERPL-CCNC: 20 U/L
ANION GAP SERPL CALC-SCNC: 6 MMOL/L (ref 0–18)
ANTIBODY SCREEN: NEGATIVE
AST SERPL-CCNC: 17 U/L (ref 15–37)
BASOPHILS # BLD AUTO: 0.07 X10(3) UL (ref 0–0.2)
BASOPHILS NFR BLD AUTO: 0.8 %
BILIRUB SERPL-MCNC: 0.4 MG/DL (ref 0.1–2)
BUN BLD-MCNC: 30 MG/DL (ref 7–18)
CALCIUM BLD-MCNC: 9.6 MG/DL (ref 8.5–10.1)
CHLORIDE SERPL-SCNC: 104 MMOL/L (ref 98–112)
CO2 SERPL-SCNC: 28 MMOL/L (ref 21–32)
CREAT BLD-MCNC: 0.84 MG/DL
EOSINOPHIL # BLD AUTO: 0.19 X10(3) UL (ref 0–0.7)
EOSINOPHIL NFR BLD AUTO: 2.2 %
ERYTHROCYTE [DISTWIDTH] IN BLOOD BY AUTOMATED COUNT: 13.8 %
FASTING STATUS PATIENT QL REPORTED: NO
GLOBULIN PLAS-MCNC: 3.6 G/DL (ref 2.8–4.4)
GLUCOSE BLD-MCNC: 97 MG/DL (ref 70–99)
HCT VFR BLD AUTO: 45.1 %
HGB BLD-MCNC: 14.5 G/DL
IMM GRANULOCYTES # BLD AUTO: 0.03 X10(3) UL (ref 0–1)
IMM GRANULOCYTES NFR BLD: 0.3 %
LYMPHOCYTES # BLD AUTO: 1.48 X10(3) UL (ref 1–4)
LYMPHOCYTES NFR BLD AUTO: 17.2 %
MCH RBC QN AUTO: 28.3 PG (ref 26–34)
MCHC RBC AUTO-ENTMCNC: 32.2 G/DL (ref 31–37)
MCV RBC AUTO: 87.9 FL
MONOCYTES # BLD AUTO: 0.58 X10(3) UL (ref 0.1–1)
MONOCYTES NFR BLD AUTO: 6.8 %
NEUTROPHILS # BLD AUTO: 6.24 X10 (3) UL (ref 1.5–7.7)
NEUTROPHILS # BLD AUTO: 6.24 X10(3) UL (ref 1.5–7.7)
NEUTROPHILS NFR BLD AUTO: 72.7 %
OSMOLALITY SERPL CALC.SUM OF ELEC: 292 MOSM/KG (ref 275–295)
PLATELET # BLD AUTO: 254 10(3)UL (ref 150–450)
POTASSIUM SERPL-SCNC: 4.3 MMOL/L (ref 3.5–5.1)
PROT SERPL-MCNC: 7.2 G/DL (ref 6.4–8.2)
RBC # BLD AUTO: 5.13 X10(6)UL
RH BLOOD TYPE: POSITIVE
SODIUM SERPL-SCNC: 138 MMOL/L (ref 136–145)
WBC # BLD AUTO: 8.6 X10(3) UL (ref 4–11)

## 2022-01-10 PROCEDURE — 86900 BLOOD TYPING SEROLOGIC ABO: CPT

## 2022-01-10 PROCEDURE — 93005 ELECTROCARDIOGRAM TRACING: CPT

## 2022-01-10 PROCEDURE — 87081 CULTURE SCREEN ONLY: CPT

## 2022-01-10 PROCEDURE — 80053 COMPREHEN METABOLIC PANEL: CPT

## 2022-01-10 PROCEDURE — 85025 COMPLETE CBC W/AUTO DIFF WBC: CPT

## 2022-01-10 PROCEDURE — 86901 BLOOD TYPING SEROLOGIC RH(D): CPT

## 2022-01-10 PROCEDURE — 36415 COLL VENOUS BLD VENIPUNCTURE: CPT

## 2022-01-10 PROCEDURE — 93010 ELECTROCARDIOGRAM REPORT: CPT | Performed by: INTERNAL MEDICINE

## 2022-01-10 PROCEDURE — 86850 RBC ANTIBODY SCREEN: CPT

## 2022-01-11 ENCOUNTER — TELEPHONE (OUTPATIENT)
Dept: ORTHOPEDICS CLINIC | Facility: CLINIC | Age: 82
End: 2022-01-11

## 2022-01-11 LAB
ATRIAL RATE: 78 BPM
P AXIS: 78 DEGREES
P-R INTERVAL: 128 MS
Q-T INTERVAL: 360 MS
QRS DURATION: 64 MS
QTC CALCULATION (BEZET): 410 MS
R AXIS: 6 DEGREES
T AXIS: -5 DEGREES
VENTRICULAR RATE: 78 BPM

## 2022-01-11 NOTE — TELEPHONE ENCOUNTER
Gilberto Shaw from Dr. Martins [place] office is requesting if they can just addend 12/29 visit of patient and use it for H&P. Please advise. Thanks.     Gilberto Shaw can be reached at [phone]

## 2022-01-12 NOTE — TELEPHONE ENCOUNTER
Called and spoke with Lan Miller from Dr. Teresa Simeon office, I let her know that Dr. Aurelia Srinivasan states that he is ok with them addening the office visit note from 12/29/21.  Lan Miller stated that Jeanna Pedersen did come in for her clearance visit yesterday but she did end up leaving

## 2022-01-17 ENCOUNTER — TELEPHONE (OUTPATIENT)
Dept: ORTHOPEDICS CLINIC | Facility: CLINIC | Age: 82
End: 2022-01-17

## 2022-01-17 NOTE — TELEPHONE ENCOUNTER
Kia Atkinson from [facility] faxed over an instructions from last 01/06 and she needs it fax back of the medication that is needed for surgery. Please advise. Thanks.     Please fax back to: [phone]    Kia Atkinson can be reached at [phone]

## 2022-01-17 NOTE — TELEPHONE ENCOUNTER
Spoke with Estrella Brooks from [facility]. Confirmed with Paul Beckett MA that we did not receive the first medication contraindication sheet. Per Estrella Brooks, it was refaxed today.  Fax was printed and handed to Santiago Davidson to review with Dr. Jeanine Russ in clinic for pt's upcoming surger

## 2022-01-25 ENCOUNTER — TELEPHONE (OUTPATIENT)
Dept: ORTHOPEDICS CLINIC | Facility: CLINIC | Age: 82
End: 2022-01-25

## 2022-01-25 NOTE — TELEPHONE ENCOUNTER
Patient would like to know if she can take the z-lloyd before the surgery. Please advise. Thanks.     Patient can be reached at [phone]

## 2022-01-25 NOTE — TELEPHONE ENCOUNTER
DOS: 1/31/22 Hardware removal with right total hip arthroplasty. Patient states that she will be seeing her PCP for sinus pain and will call us back if she is placed on abx.

## 2022-01-25 NOTE — TELEPHONE ENCOUNTER
Called and spoke with Juan Martines, in regards to her wanting to know when she should stop taking advil. She states that she wanted to know if she could take a z-pack prior to having surgery.  I did inquire weather or not if she had any infections which would be

## 2022-01-26 ENCOUNTER — TELEPHONE (OUTPATIENT)
Dept: ORTHOPEDICS CLINIC | Facility: CLINIC | Age: 82
End: 2022-01-26

## 2022-01-26 DIAGNOSIS — S72.001K CLOSED DISPLACED FRACTURE OF RIGHT FEMORAL NECK WITH NONUNION: Primary | ICD-10-CM

## 2022-01-26 NOTE — TELEPHONE ENCOUNTER
Called and spoke with Willian Atwood her that because of her tooth infection we will need to reschedule her surgery. She states that she understood and wondered when she could proceed with surgery.  I did explain to her that in order to proceed with surg

## 2022-01-26 NOTE — TELEPHONE ENCOUNTER
Patient calling stating she has a tooth infection and is taking an antibiotic. Patient is requesting a call back, in regards to if she needs to reschedule surgery.

## 2022-02-21 ENCOUNTER — TELEPHONE (OUTPATIENT)
Dept: ORTHOPEDICS CLINIC | Facility: CLINIC | Age: 82
End: 2022-02-21

## 2022-02-23 NOTE — TELEPHONE ENCOUNTER
Spoke with Karly Neves in regards to re scheduling her for surgery. I did inform her that he is booked for the month of March and April the earliest if would be able to schedule her would be 5/2/22. She states that she is in a lot of pain and does not want to wait until then. She wants to know if there is another doctor that Dr. Vivek Alcantar could recommend to perform surgery for her. I stated that I would discuss her concerns with Dr. Vivek Alcantar and see if there is anything else he could do and  I would reach back out to her with his answer. She stated understanding.

## 2022-02-24 ENCOUNTER — TELEPHONE (OUTPATIENT)
Dept: ORTHOPEDICS CLINIC | Facility: CLINIC | Age: 82
End: 2022-02-24

## 2022-02-24 NOTE — TELEPHONE ENCOUNTER
Patient called and wants to know why she isn't getting a call back and is feeling ignored. She wants a nurse or Dr. Jovan Paulson to call her back. She is asking why we don't care about her and should she find a different provider. I explained that we have called her back and talked to her and we aren't ignoring her. She also wants her surgery sooner than scheduled. Please call back.

## 2022-02-24 NOTE — TELEPHONE ENCOUNTER
Called and spoke with Liv Lopez in regards to getting her scheduled for surgery. I did explain to her that she as not been forgotten about nor do we not care that she is in pain. I explained to her again what we discussed yesterday about having to speak with Dr. Jovan Paulson about this matter. She stated that she understood. I explained to her that once I speak with Dr. Jovan Paulson I would give her a call back . She stated understanding. All questions and concerns have been answered.

## 2022-03-04 ENCOUNTER — TELEPHONE (OUTPATIENT)
Dept: ORTHOPEDICS CLINIC | Facility: CLINIC | Age: 82
End: 2022-03-04

## 2022-03-04 NOTE — TELEPHONE ENCOUNTER
Patient demanded that we fax her medical records to another provider's office during clinic hours. Her presence was a disturbance to the patient checking in and out at the . She and her guest keep engaging Arti Smith or I in conversation about her multiple concerns. Patient also had multiple complaints about the customer service she has received from our office, that her surgery was scheduled so far out, and that she did not feel she was being treated fairly because of her age or maybe the sound of her voice. When she refused to leave, a medical records request completed and faxed to [facility]; I had trouble faxing the 112 pages of medical records that she wanted to go to her new providers office. I attempted on both fax machines. Patient took her medical records with her. Dr. Xiomara Daniel and Liliane Norris were brought into the conversation; it is my understanding that [facility] [address] [facility] was changed so that she could have a sooner date for SX.

## 2022-03-10 ENCOUNTER — TELEPHONE (OUTPATIENT)
Dept: ORTHOPEDICS CLINIC | Facility: CLINIC | Age: 82
End: 2022-03-10

## 2022-03-10 NOTE — TELEPHONE ENCOUNTER
Surgeon: Dr. Jennifer Joiner    Date of Surgery: 3/31/2022       Post Op Appt: 4/8/2022    Facility: [facility]    Inpatient or Outpatient: Outpatient    Surgical Assistant: yes    Preadmission Testing Ordered:  yes    Pre-Op Clearance Requested:   PCP: yes   Cardiac: yes   Pulmonary: no   Dental: no   Other: no    DME: yes    Rehab Services Ordered:   Home Health: no   Outpatient: no    Is this work comp related? no    Prior Authorization: pending    Disability Paperwork: no    On [place] Calendar: yes    Notes:  Yes

## 2022-03-11 NOTE — TELEPHONE ENCOUNTER
No prior auth or precert required for [employer] CPT Codes 77073, 88818- medicare, for tracking purposes only

## 2022-03-21 NOTE — TELEPHONE ENCOUNTER
Called and spoke with Flor Shea, she states that she would like to cancel her surgery, I did offer to get her rescheduled for another date she states that she is canceling  due to her now seeing another provider. I did let her know that I would go ahead and get her surgery canceled. She states understanding, all questions and concerns have been answered.

## 2022-04-07 ENCOUNTER — ORDER TRANSCRIPTION (OUTPATIENT)
Dept: ADMINISTRATIVE | Facility: HOSPITAL | Age: 82
End: 2022-04-07

## 2022-04-07 PROBLEM — K56.609 SMALL BOWEL OBSTRUCTION (HCC): Status: RESOLVED | Noted: 2020-05-01 | Resolved: 2022-04-07

## 2022-04-07 PROBLEM — D72.829 LEUKOCYTOSIS: Status: RESOLVED | Noted: 2020-09-02 | Resolved: 2022-04-07

## 2022-04-07 PROBLEM — R33.9 URINARY RETENTION: Status: RESOLVED | Noted: 2020-09-26 | Resolved: 2022-04-07

## 2022-04-07 PROBLEM — M25.559 PAIN IN JOINT INVOLVING PELVIC REGION AND THIGH, UNSPECIFIED LATERALITY: Status: RESOLVED | Noted: 2020-09-26 | Resolved: 2022-04-07

## 2022-04-07 PROBLEM — S72.001A CLOSED FRACTURE OF RIGHT HIP, INITIAL ENCOUNTER (HCC): Status: RESOLVED | Noted: 2021-08-22 | Resolved: 2022-04-07

## 2022-04-10 ENCOUNTER — LAB ENCOUNTER (OUTPATIENT)
Dept: LAB | Facility: HOSPITAL | Age: 82
End: 2022-04-10
Attending: ORTHOPAEDIC SURGERY
Payer: MEDICARE

## 2022-04-10 DIAGNOSIS — Z01.818 PREOP EXAMINATION: ICD-10-CM

## 2022-04-11 LAB — SARS-COV-2 RNA RESP QL NAA+PROBE: NOT DETECTED

## 2023-05-15 ENCOUNTER — HOSPITAL ENCOUNTER (EMERGENCY)
Facility: HOSPITAL | Age: 83
Discharge: HOME | End: 2023-05-15
Attending: EMERGENCY MEDICINE
Payer: MEDICARE

## 2023-05-15 VITALS
RESPIRATION RATE: 16 BRPM | SYSTOLIC BLOOD PRESSURE: 149 MMHG | TEMPERATURE: 98 F | HEART RATE: 90 BPM | DIASTOLIC BLOOD PRESSURE: 76 MMHG | BODY MASS INDEX: 27.82 KG/M2 | WEIGHT: 138 LBS | OXYGEN SATURATION: 95 % | HEIGHT: 59.06 IN

## 2023-05-15 DIAGNOSIS — S61.219A FINGER LACERATION, INITIAL ENCOUNTER: Primary | ICD-10-CM

## 2023-05-15 PROCEDURE — 12001 RPR S/N/AX/GEN/TRNK 2.5CM/<: CPT

## 2023-05-15 PROCEDURE — 99283 EMERGENCY DEPT VISIT LOW MDM: CPT

## 2023-05-15 RX ORDER — ASPIRIN 325 MG
325 TABLET ORAL 2 TIMES DAILY
COMMUNITY

## 2023-05-16 ENCOUNTER — HOSPITAL ENCOUNTER (EMERGENCY)
Facility: HOSPITAL | Age: 83
Discharge: HOME | End: 2023-05-16
Attending: EMERGENCY MEDICINE
Payer: MEDICARE

## 2023-05-16 VITALS
OXYGEN SATURATION: 95 % | SYSTOLIC BLOOD PRESSURE: 174 MMHG | RESPIRATION RATE: 16 BRPM | WEIGHT: 138 LBS | TEMPERATURE: 98 F | BODY MASS INDEX: 27.82 KG/M2 | DIASTOLIC BLOOD PRESSURE: 95 MMHG | HEART RATE: 92 BPM | HEIGHT: 59 IN

## 2023-05-16 DIAGNOSIS — S61.212D LACERATION OF RIGHT MIDDLE FINGER WITHOUT FOREIGN BODY WITHOUT DAMAGE TO NAIL, SUBSEQUENT ENCOUNTER: Primary | ICD-10-CM

## 2023-05-16 PROCEDURE — 99281 EMR DPT VST MAYX REQ PHY/QHP: CPT

## 2023-05-16 NOTE — ED INITIAL ASSESSMENT (HPI)
Pt with lac to right middle finger yesterday. Pt was seen last night and had dermabond applied. Pt states wound began to bleed again today and is no longer approximated.

## 2023-05-16 NOTE — DISCHARGE INSTRUCTIONS
Avoid prolonged soaking. Do not apply any ointments on the wound as this will breakdown the adhesive.

## 2023-05-22 ENCOUNTER — HOSPITAL ENCOUNTER (EMERGENCY)
Facility: HOSPITAL | Age: 83
Discharge: HOME | End: 2023-05-22
Attending: EMERGENCY MEDICINE
Payer: MEDICARE

## 2023-05-22 VITALS
BODY MASS INDEX: 27.82 KG/M2 | OXYGEN SATURATION: 94 % | DIASTOLIC BLOOD PRESSURE: 99 MMHG | HEART RATE: 76 BPM | WEIGHT: 138 LBS | TEMPERATURE: 98 F | RESPIRATION RATE: 18 BRPM | HEIGHT: 59 IN | SYSTOLIC BLOOD PRESSURE: 166 MMHG

## 2023-05-22 DIAGNOSIS — S61.212A LACERATION OF RIGHT MIDDLE FINGER WITHOUT FOREIGN BODY WITHOUT DAMAGE TO NAIL, INITIAL ENCOUNTER: Primary | ICD-10-CM

## 2023-05-22 PROCEDURE — 99283 EMERGENCY DEPT VISIT LOW MDM: CPT

## 2023-05-22 PROCEDURE — 99282 EMERGENCY DEPT VISIT SF MDM: CPT

## 2023-05-23 NOTE — DISCHARGE INSTRUCTIONS
Leave the Steri-Strips on until they fall off after that apply Neosporin and Band-Aid for a few days.   Watch for infection

## 2023-05-23 NOTE — ED INITIAL ASSESSMENT (HPI)
Laceration to right middle finger several days ago. Had glue placed twice, last time 5/16. Reports laceration was bleeding this morning. No active bleeding in triage. A&ox4.

## 2024-02-24 ENCOUNTER — HOSPITAL ENCOUNTER (EMERGENCY)
Facility: HOSPITAL | Age: 84
Discharge: HOME | End: 2024-02-24
Attending: EMERGENCY MEDICINE
Payer: MEDICARE

## 2024-02-24 ENCOUNTER — APPOINTMENT (OUTPATIENT)
Dept: GENERAL RADIOLOGY | Facility: HOSPITAL | Age: 84
End: 2024-02-24
Payer: MEDICARE

## 2024-02-24 VITALS
TEMPERATURE: 98 F | RESPIRATION RATE: 14 BRPM | HEART RATE: 76 BPM | SYSTOLIC BLOOD PRESSURE: 139 MMHG | DIASTOLIC BLOOD PRESSURE: 62 MMHG | OXYGEN SATURATION: 94 %

## 2024-02-24 DIAGNOSIS — S60.222A CONTUSION OF LEFT HAND, INITIAL ENCOUNTER: Primary | ICD-10-CM

## 2024-02-24 PROCEDURE — 99283 EMERGENCY DEPT VISIT LOW MDM: CPT

## 2024-02-24 PROCEDURE — 99284 EMERGENCY DEPT VISIT MOD MDM: CPT

## 2024-02-24 PROCEDURE — 73130 X-RAY EXAM OF HAND: CPT | Performed by: EMERGENCY MEDICINE

## 2024-02-24 PROCEDURE — 73130 X-RAY EXAM OF HAND: CPT

## 2024-02-25 NOTE — DISCHARGE INSTRUCTIONS
Tylenol as needed for any discomfort  Elevate hand while at rest  Cold compress topically  Return for any problems  Recheck with your primary care physician as needed

## 2024-02-25 NOTE — ED INITIAL ASSESSMENT (HPI)
To the ED with c/o left hand swelling. States she woke up in the middle of the noc and she was not able to move her hand or close it. States she walks her dogs and holds on it it with the same hand, and was thinking maybe the dogs just pulled on it too hard. She also states she broke the same hand 25 years ago. States it hurts to close her hand all the way.

## 2024-02-25 NOTE — ED PROVIDER NOTES
Patient Seen in: [facility] Emergency Department      History     Chief Complaint   Patient presents with    Arm or Hand Injury     Left hand swelling     Stated Complaint: Left hand swelling, no known injury    Subjective:   HPI    This is an left-handed 83-year-old female past medical history of anal cancer, emphysema, SBO who presents with chief complaint of left hand swelling.  She apparently woke up during the middle night and states she found it this way.  She does walk her dogs and holds the leash in that same hand.  She reports were pulling very hard on that hand earlier in the day and she did have some pain.  She denies any falls or direct trauma.  He is not on anticoagulants but takes aspirin daily.  She presents here for further evaluation.    Objective:   Past Medical History:   Diagnosis Date    Allergic rhinitis     Anal cancer (HCC) 2007    invasive basaloid squamous cell carcinoma, s/p chemo/XRT. [facility]; Dr Barhammand    Aortic arch atherosclerosis (HCC)     CT 2012 - on ASA    Back problem     disc problems    Blood type O+     Bowel obstruction (HCC) 2021    Chronic shoulder pain     Exposure to unspecified radiation 2007    Family history of premature CAD     brother 40s    History of rotator cuff tear     b/l    IFG (impaired fasting glucose)     Lumbar arthropathy 10/26/2017    Mixed hyperlipidemia     Muscle weakness     kiran legs but improving after stopping Crestor    Osteoarthrosis, unspecified whether generalized or localized, unspecified site     Personal history of antineoplastic chemotherapy 2007    Pulmonary emphysema (HCC)     CT 2012    Small bowel obstruction (HCC) 5/1/2020    Statin intolerance     Visual impairment     Vitamin D insufficiency               Past Surgical History:   Procedure Laterality Date    APPENDECTOMY      CATARACT  11/2017    CHOLECYSTECTOMY  2015    COLONOSCOPY  2015    normal    FOOT SURGERY Right 09/11/2018    lapinski    HIP FRACTURE SURGERY Right  2021    HYSTERECTOMY  1985    for fibroids    IMPLANT LEFT  1988    IMPLANT RIGHT  's          OOPHORECTOMY Bilateral     OTHER SURGICAL HISTORY      10 breast surg - implants - no cancer  -     OTHER SURGICAL HISTORY      left leg muscle surgery    REDUCTION LEFT      REDUCTION RIGHT      THYROIDECTOMY  2019    partial                 Social History     Socioeconomic History    Marital status: Single   Tobacco Use    Smoking status: Former     Packs/day: 1.00     Years: 56.00     Additional pack years: 0.00     Total pack years: 56.00     Types: Cigarettes     Start date: 1959     Quit date: 2015     Years since quittin.1    Smokeless tobacco: Never    Tobacco comments:     max 2 packs, averaged about a pack a day    Vaping Use    Vaping Use: Never used   Substance and Sexual Activity    Alcohol use: Yes     Alcohol/week: 0.0 standard drinks of alcohol     Comment: holidays / glass    Drug use: No   Other Topics Concern    Caffeine Concern No    Exercise Yes    Seat Belt Yes    Special Diet No    Stress Concern No    Weight Concern No   Social History Narrative    marital status:     occupation: retired     caffeine: 1 cup decaf    blood tranfusion: no    hiv exposure: no    travel: domestic;leved in [place] 4 yrs 20 yrs ago    exercise: gym; walks treadmill    mammo: 2011    dexa: never    pap: yoan bso 2009    colonoscopy:     lab: DR Barhammand    prostate:      testicles:      influenza: 2013    bdt: ?    pneumovax: 2013    zoster: no                              Review of Systems    Positive for stated complaint: Left hand swelling, no known injury  Other systems are as noted in HPI.  Constitutional and vital signs reviewed.      All other systems reviewed and negative except as noted above.    Physical Exam     ED Triage Vitals [24 1809]   /62   Pulse 76   Resp 14   Temp 97.7 °F (36.5 °C)   Temp src    SpO2 94 %   O2 Device  None (Room air)       Current:/62   Pulse 76   Temp 97.7 °F (36.5 °C)   Resp 14   LMP  (LMP Unknown)   SpO2 94%         Physical Exam    GENERAL: Awake, alert oriented x3, nontoxic appearing.   SKIN: Normal, warm, and dry.  HEENT:  Pupils equally round and reactive to light. Conjuctiva clear.  Oropharynx is clear and moist.   Lungs: Clear to auscultation bilaterally with no rales, no retractions, and no wheezing.  HEART:  Regular rate and rhythm. S1 and S2. No murmurs, no rubs or gallops.   ABDOMEN: Soft, nontender and nondistended. Normoactive bowel sounds. No rebound. No guarding.   EXTREMITIES:   Warm to the dorsum of the left hand it is ecchymotic.  She can flex and extend her fingers both actively and passively against resistance.  Good strength.    ED Course   Labs Reviewed - No data to display        XR HAND (MIN 3 VIEWS), LEFT (CPT=73130)    Result Date: 2/24/2024  PROCEDURE:  XR HAND (MIN 3 VIEWS), LEFT (CPT=73130)  TECHNIQUE:  Three views of the left hand were obtained.  COMPARISON:  [facility] , XR, XR HAND (MIN 3 VIEWS), LEFT (CPT=73130), 8/09/2020, 11:34 AM.  INDICATIONS:  Left hand swelling, no known injury  PATIENT STATED HISTORY: (As transcribed by Technologist)  Patient states left hand swelling that started yesterday. She states left hand injury 25yrs ago.               CONCLUSION:   No acute fracture or malalignment.  Remodeling of the distal radius consistent with remote injury.  Mild osteoarthritis of the 1st CMC, 1st MCP, and scattered interphalangeal joints.  No periarticular erosions.  No focal soft tissue swelling.   LOCATION:  [facility]   Dictated by (CST): Arleen Luna MD on 2/24/2024 at 6:50 PM     Finalized by (CST): Arleen Luna MD on 2/24/2024 at 6:51 PM                MDM                83-year-old female with pain to dorsum of left hand after walking dogs.  Differential includes contusion, fracture, sprain.        Patient declined any Tylenol.    I independently reviewed the left  hand x-ray was obtained and showed no fracture or malalignment.  No acute injury noted.    .  I also reviewed the radiology interpretation as above.      Patient is contusion left hand.  Recommend cold compress.  Tylenol ibuprofen.  Elevate at rest.  Return for any problems.  Patient discharged home in good condition.          Disposition and Plan     Clinical Impression:  1. Contusion of left hand, initial encounter         Disposition:  Discharge  2/24/2024  6:59 pm    Follow-up:  your doctor    Follow up  As needed          Medications Prescribed:  Discharge Medication List as of 2/24/2024  7:00 PM

## 2024-05-31 ENCOUNTER — HOSPITAL ENCOUNTER (EMERGENCY)
Facility: HOSPITAL | Age: 84
Discharge: HOME | End: 2024-06-01
Attending: EMERGENCY MEDICINE
Payer: MEDICARE

## 2024-05-31 DIAGNOSIS — S60.221A CONTUSION OF RIGHT HAND, INITIAL ENCOUNTER: ICD-10-CM

## 2024-05-31 DIAGNOSIS — S09.90XA INJURY OF HEAD, INITIAL ENCOUNTER: Primary | ICD-10-CM

## 2024-05-31 PROCEDURE — 99284 EMERGENCY DEPT VISIT MOD MDM: CPT

## 2024-06-01 ENCOUNTER — APPOINTMENT (OUTPATIENT)
Dept: CT IMAGING | Facility: HOSPITAL | Age: 84
End: 2024-06-01
Attending: EMERGENCY MEDICINE
Payer: MEDICARE

## 2024-06-01 VITALS
OXYGEN SATURATION: 98 % | RESPIRATION RATE: 18 BRPM | BODY MASS INDEX: 28 KG/M2 | SYSTOLIC BLOOD PRESSURE: 150 MMHG | HEART RATE: 72 BPM | TEMPERATURE: 98 F | WEIGHT: 136.69 LBS | DIASTOLIC BLOOD PRESSURE: 76 MMHG

## 2024-06-01 PROCEDURE — 70450 CT HEAD/BRAIN W/O DYE: CPT | Performed by: EMERGENCY MEDICINE

## 2024-06-01 NOTE — ED INITIAL ASSESSMENT (HPI)
Pt was walking her dog and was pulled forward and fell. Pt hit her forehead, no LOC. Bilateral hand bruising and bilateral knee scrapes. No thinners.

## 2024-06-01 NOTE — ED PROVIDER NOTES
Patient Seen in: [facility] Emergency Department      History     Chief Complaint   Patient presents with    Fall     Stated Complaint: fall, + hit head,    Subjective:   HPI    Patient is an 83-year-old female presents to emergency room for evaluation of for evaluation of a fall.  Patient was walking her dog on a leash.  The dog saw a rabbit and pulled the leash causing her to fall forward and hit her head.  She had the left-sided of her forehead.  She has some pain to this area and left superior orbital region.  No loss of consciousness.  She takes aspirin twice daily.  She denies vomiting.  No neck or back pain.  No chest or abdominal pain.  She denies any other injuries except for abrasions to her her knees.  Last tetanus shot in 2017    Objective:   Past Medical History:    Allergic rhinitis    Anal cancer (HCC)    invasive basaloid squamous cell carcinoma, s/p chemo/XRT. [facility]; Dr Barhammand    Aortic arch atherosclerosis (HCC)    CT 2012 - on ASA    Back problem    disc problems    Blood type O+    Bowel obstruction (HCC)    Chronic shoulder pain    Exposure to unspecified radiation    Family history of premature CAD    brother 40s    History of rotator cuff tear    b/l    IFG (impaired fasting glucose)    Lumbar arthropathy    Mixed hyperlipidemia    Muscle weakness    kiran legs but improving after stopping Crestor    Osteoarthrosis, unspecified whether generalized or localized, unspecified site    Personal history of antineoplastic chemotherapy    Pulmonary emphysema (HCC)    CT 2012    Small bowel obstruction (HCC)    Statin intolerance    Visual impairment    Vitamin D insufficiency              Past Surgical History:   Procedure Laterality Date    Appendectomy      Cataract  11/2017    Cholecystectomy  2015    Colonoscopy  2015    normal    Foot surgery Right 09/11/2018    lapinski    Hip fracture surgery Right 09/2021    Hysterectomy  1985    for fibroids    Implant left  1988    Implant right   1970's          Oophorectomy Bilateral     Other surgical history      10 breast surg - implants - no cancer  -     Other surgical history  2012    left leg muscle surgery    Reduction left  's    Reduction right  's    Thyroidectomy  2019    partial                 Social History     Socioeconomic History    Marital status: Single   Tobacco Use    Smoking status: Former     Current packs/day: 0.00     Average packs/day: 1 pack/day for 56.0 years (56.0 ttl pk-yrs)     Types: Cigarettes     Start date: 1959     Quit date: 2015     Years since quittin.4    Smokeless tobacco: Never    Tobacco comments:     max 2 packs, averaged about a pack a day    Vaping Use    Vaping status: Never Used   Substance and Sexual Activity    Alcohol use: Yes     Alcohol/week: 0.0 standard drinks of alcohol     Comment: holidays 1/2 glass    Drug use: No   Other Topics Concern    Caffeine Concern No    Exercise Yes    Seat Belt Yes    Special Diet No    Stress Concern No    Weight Concern No   Social History Narrative    marital status:     occupation: retired     caffeine: 1 cup decaf    blood tranfusion: no    hiv exposure: no    travel: domestic;leved in [place] 4 yrs 20 yrs ago    exercise: gym; walks treadmill    mammo:     dexa: never    pap: yoan bso 2009    colonoscopy:     lab: DR Barhammand    prostate:      testicles:      influenza:     bdt: ?    pneumovax: 2013    zoster: no                              Review of Systems    Positive for stated complaint: fall, + hit head,  Other systems are as noted in HPI.  Constitutional and vital signs reviewed.      All other systems reviewed and negative except as noted above.    Physical Exam     ED Triage Vitals [24 2319]   /80   Pulse 72   Resp 18   Temp 97.8 °F (36.6 °C)   Temp src Temporal   SpO2 96 %   O2 Device None (Room air)       Current Vitals:   Vital Signs  BP: 155/80  Pulse: 72  Resp: 18  Temp:  97.8 °F (36.6 °C)  Temp src: Temporal    Oxygen Therapy  SpO2: 96 %  O2 Device: None (Room air)            Physical Exam    GENERAL: No apparent distress, nontoxic, well-appearing.  HEENT: Normocephalic, atraumatic.  Moist mucous membranes.  Pupils equal round reactive to light accommodation, extraocular motion is intact, sclerae white, conjunctiva is pink.  Oropharynx is unremarkable, no exudate, dentition intact, no facial bone tenderness or septal hematomas, TMs clear bilaterally  NECK: Supple, trachea midline, no lymphadenopathy, full ROM cervical spine without any pain, no pain on axial loading.      No midline cervical spine tenderness  LUNG: Lungs clear to auscultation bilaterally, no wheezing, no rales, no rhonchi.  CARDIOVASCULAR: Regular rate and rhythm, normal S1-S2, no S3-S4 or murmur  CHEST: No tenderness, no crepitus, no ecchymosis, no abrasions  BACK: No midline lumbar or thoracic tenderness, no evidence for ecchymosis or abrasions, no CVA tenderness  PELVIS: Pelvis is stable to compression.  ABDOMEN: Bowel sounds are present, soft, nondistended, no pulsatile masses, nontender  MUSCULOSKELETAL: No calf tenderness, no clubbing, no cyanosis, or edema.  Dorsalis pedis and posterior tibial pulses 2+.  No long bone tenderness or deformities noted.  Patient has some bruising to her distal right hand at the second and third metacarpal but these areas are nontender.  Some mild bruising the dorsal aspect the left hand but left hand is nontender.  SKIN EXAMINATION: Bilateral abrasions noted inferior to the patellas  NEUROLOGICAL:  alert and oriented X 3, motor 5/5 all groups, normal sensation, speech is intact.    ED Course   Labs Reviewed - No data to display          I personally reviewd CT images of head and independent interpretation shows no acute bleed.  I also viewed formal radiology report as read by radiology with findings below:  CT head read by vision rad radiology is negative for bleed or acute  process         MDM      Patient is an 83-year-old female presents emergency room status post fall nonsyncopal with head injury.  Differential includes minor head injury, intracranial bleed.  Head CT negative for bleed.  No evidence for orbital fractures.  Patient symptoms consistent with minor head injury.  Head injury instructions will be given.  Tylenol for pain.  Ice to painful areas.    Patient was screened and evaluated during this visit.   As a treating physician attending to the patient, I determined, within reasonable clinical confidence and prior to discharge, that an emergency medical condition was not or was no longer present.  There was no indication for further evaluation, treatment or admission on an emergency basis.  Comprehensive verbal and written discharge and follow-up instructions were provided to help prevent relapse or worsening.  Patient was instructed to follow-up with her primary care provider for further evaluation and treatment, but to return immediately to the ER for worsening, concerning, new, changing or persisting symptoms.  I discussed the case with the patient and they had no questions, complaints, or concerns.  Patient felt comfortable going home.                  Medical Decision Making      Disposition and Plan     Clinical Impression:  1. Injury of head, initial encounter    2. Contusion of right hand, initial encounter         Disposition:  Discharge  6/1/2024  1:04 am    Follow-up:  Kassi Serra MD  [address] [address]  [phone]    Follow up  As needed          Medications Prescribed:  Current Discharge Medication List

## 2024-06-01 NOTE — DISCHARGE INSTRUCTIONS
To ED if increased pain, vomiting, mental status changes, weakness, numbness or tingling    Tylenol for pain    Ice to painful area

## 2024-07-18 ENCOUNTER — LAB REQUISITION (OUTPATIENT)
Dept: LAB | Age: 84
End: 2024-07-18

## 2024-07-18 DIAGNOSIS — R10.13 EPIGASTRIC PAIN: ICD-10-CM

## 2024-07-18 PROCEDURE — 86141 C-REACTIVE PROTEIN HS: CPT | Performed by: CLINICAL MEDICAL LABORATORY

## 2024-07-18 PROCEDURE — 85025 COMPLETE CBC W/AUTO DIFF WBC: CPT | Performed by: CLINICAL MEDICAL LABORATORY

## 2024-07-18 PROCEDURE — 83690 ASSAY OF LIPASE: CPT | Performed by: CLINICAL MEDICAL LABORATORY

## 2024-07-18 PROCEDURE — 80053 COMPREHEN METABOLIC PANEL: CPT | Performed by: CLINICAL MEDICAL LABORATORY

## 2024-07-19 LAB
ALBUMIN SERPL-MCNC: 3.7 G/DL (ref 3.6–5.1)
ALBUMIN/GLOB SERPL: 1.1 {RATIO} (ref 1–2.4)
ALP SERPL-CCNC: 94 UNITS/L (ref 45–117)
ALT SERPL-CCNC: 23 UNITS/L
ANION GAP SERPL CALC-SCNC: 10 MMOL/L (ref 7–19)
AST SERPL-CCNC: 25 UNITS/L
BASOPHILS # BLD: 0.1 K/MCL (ref 0–0.3)
BASOPHILS NFR BLD: 1 %
BILIRUB SERPL-MCNC: 0.4 MG/DL (ref 0.2–1)
BUN SERPL-MCNC: 22 MG/DL (ref 6–20)
BUN/CREAT SERPL: 28 (ref 7–25)
CALCIUM SERPL-MCNC: 9.2 MG/DL (ref 8.4–10.2)
CHLORIDE SERPL-SCNC: 109 MMOL/L (ref 97–110)
CO2 SERPL-SCNC: 27 MMOL/L (ref 21–32)
CREAT SERPL-MCNC: 0.78 MG/DL (ref 0.51–0.95)
CRP SERPL HS-MCNC: 3.01 MG/L
DEPRECATED RDW RBC: 44.5 FL (ref 39–50)
EGFRCR SERPLBLD CKD-EPI 2021: 76 ML/MIN/{1.73_M2}
EOSINOPHIL # BLD: 0.3 K/MCL (ref 0–0.5)
EOSINOPHIL NFR BLD: 4 %
ERYTHROCYTE [DISTWIDTH] IN BLOOD: 13.6 % (ref 11–15)
FASTING DURATION TIME PATIENT: ABNORMAL H
GLOBULIN SER-MCNC: 3.3 G/DL (ref 2–4)
GLUCOSE SERPL-MCNC: 99 MG/DL (ref 70–99)
HCT VFR BLD CALC: 46.9 % (ref 36–46.5)
HGB BLD-MCNC: 15.3 G/DL (ref 12–15.5)
IMM GRANULOCYTES # BLD AUTO: 0 K/MCL (ref 0–0.2)
IMM GRANULOCYTES # BLD: 0 %
LIPASE SERPL-CCNC: 37 UNITS/L (ref 15–77)
LYMPHOCYTES # BLD: 1.3 K/MCL (ref 1–4)
LYMPHOCYTES NFR BLD: 17 %
MCH RBC QN AUTO: 29.1 PG (ref 26–34)
MCHC RBC AUTO-ENTMCNC: 32.6 G/DL (ref 32–36.5)
MCV RBC AUTO: 89.2 FL (ref 78–100)
MONOCYTES # BLD: 0.4 K/MCL (ref 0.3–0.9)
MONOCYTES NFR BLD: 6 %
NEUTROPHILS # BLD: 5.6 K/MCL (ref 1.8–7.7)
NEUTROPHILS NFR BLD: 72 %
NRBC BLD MANUAL-RTO: 0 /100 WBC
PLATELET # BLD AUTO: 230 K/MCL (ref 140–450)
POTASSIUM SERPL-SCNC: 4.4 MMOL/L (ref 3.4–5.1)
PROT SERPL-MCNC: 7 G/DL (ref 6.4–8.2)
RBC # BLD: 5.26 MIL/MCL (ref 4–5.2)
SODIUM SERPL-SCNC: 142 MMOL/L (ref 135–145)
WBC # BLD: 7.7 K/MCL (ref 4.2–11)

## 2024-08-17 ENCOUNTER — HOSPITAL ENCOUNTER (EMERGENCY)
Facility: HOSPITAL | Age: 84
Discharge: HOME | End: 2024-08-17
Attending: EMERGENCY MEDICINE
Payer: MEDICARE

## 2024-08-17 VITALS
HEART RATE: 98 BPM | HEIGHT: 58 IN | RESPIRATION RATE: 18 BRPM | TEMPERATURE: 97 F | DIASTOLIC BLOOD PRESSURE: 78 MMHG | WEIGHT: 140 LBS | OXYGEN SATURATION: 97 % | SYSTOLIC BLOOD PRESSURE: 132 MMHG | BODY MASS INDEX: 29.39 KG/M2

## 2024-08-17 DIAGNOSIS — T63.484A INSECT STINGS, UNDETERMINED INTENT, INITIAL ENCOUNTER: Primary | ICD-10-CM

## 2024-08-17 PROCEDURE — 99282 EMERGENCY DEPT VISIT SF MDM: CPT

## 2024-08-17 PROCEDURE — 99283 EMERGENCY DEPT VISIT LOW MDM: CPT

## 2024-08-18 NOTE — DISCHARGE INSTRUCTIONS
Benadryl 25 mg every 6-8 hours as needed for symptoms at home  Return to the ER if symptoms worsen or if any other problems arise

## 2024-08-18 NOTE — ED INITIAL ASSESSMENT (HPI)
Red, circular, raised area to L antecubital that started 2 hrs pta. Reports she was out on her balcony and unsure if she was bit by something. States the area has increased in swelling.

## 2024-08-18 NOTE — ED PROVIDER NOTES
Patient Seen in: [facility] Emergency Department      History     Chief Complaint   Patient presents with    Bite Sting,Insect     Stated Complaint: rash to left arm for two hours, states she was bitten by somthing. painful to t*    Subjective:   HPI    Patient is a 84-year-old female who is right-hand dominant presents emergency room with a history of being possibly bitten or stung by something in the left arm a couple hours prior to arrival in the emergency room.  She was out on her balcony and is unsure exactly what happened.  The patient noted some redness and swelling to the left elbow area only.  The patient denies history of any difficulty breathing or swallowing.  The patient denies history of any history of reactions to stings or bites like this in the past.  The patient denies history of any recent blood draw or trauma to the arm.  The patient denies history of any other somatic complaints or discomfort at this time.          Objective:   Past Medical History:    Allergic rhinitis    Anal cancer (HCC)    invasive basaloid squamous cell carcinoma, s/p chemo/XRT. [facility]; Dr Barhammand    Aortic arch atherosclerosis (HCC)    CT 2012 - on ASA    Back problem    disc problems    Blood type O+    Bowel obstruction (HCC)    Chronic shoulder pain    Exposure to unspecified radiation    Family history of premature CAD    brother 40s    History of rotator cuff tear    b/l    IFG (impaired fasting glucose)    Lumbar arthropathy    Mixed hyperlipidemia    Muscle weakness    kiran legs but improving after stopping Crestor    Osteoarthrosis, unspecified whether generalized or localized, unspecified site    Personal history of antineoplastic chemotherapy    Pulmonary emphysema (HCC)    CT 2012    Small bowel obstruction (HCC)    Statin intolerance    Visual impairment    Vitamin D insufficiency              Past Surgical History:   Procedure Laterality Date    Appendectomy      Cataract  11/2017    Cholecystectomy       Colonoscopy  2015    normal    Foot surgery Right 2018    lapinski    Hip fracture surgery Right 2021    Hysterectomy  1985    for fibroids    Implant left  1988    Implant right  1970's          Oophorectomy Bilateral     Other surgical history      10 breast surg - implants - no cancer  -     Other surgical history  2012    left leg muscle surgery    Reduction left  's    Reduction right  1970's    Thyroidectomy  2019    partial                 Social History     Socioeconomic History    Marital status: Single   Tobacco Use    Smoking status: Former     Current packs/day: 0.00     Average packs/day: 1 pack/day for 56.0 years (56.0 ttl pk-yrs)     Types: Cigarettes     Start date: 1959     Quit date: 2015     Years since quittin.6    Smokeless tobacco: Never    Tobacco comments:     max 2 packs, averaged about a pack a day    Vaping Use    Vaping status: Never Used   Substance and Sexual Activity    Alcohol use: Yes     Alcohol/week: 0.0 standard drinks of alcohol     Comment: holidays 1/2 glass    Drug use: No   Other Topics Concern    Caffeine Concern No    Exercise Yes    Seat Belt Yes    Special Diet No    Stress Concern No    Weight Concern No   Social History Narrative    marital status:     occupation: retired     caffeine: 1 cup decaf    blood tranfusion: no    hiv exposure: no    travel: domestic;leved in [place] 4 yrs 20 yrs ago    exercise: gym; walks treadmill    mammo:     dexa: never    pap: yoan bso 2009    colonoscopy:     lab: DR Barhammand    prostate:      testicles:      influenza: 2013    bdt: ?    pneumovax: 2013    zoster: no                              Review of Systems    Positive for stated Chief Complaint: Bite Sting,Insect    Other systems are as noted in HPI.  Constitutional and vital signs reviewed.      All other systems reviewed and negative except as noted above.    Physical Exam     ED Triage Vitals  [08/17/24 1955]   /78   Pulse 98   Resp 18   Temp 97.2 °F (36.2 °C)   Temp src Temporal   SpO2 97 %   O2 Device None (Room air)       Current Vitals:   Vital Signs  BP: 132/78  Pulse: 98  Resp: 18  Temp: 97.2 °F (36.2 °C)  Temp src: Temporal    Oxygen Therapy  SpO2: 97 %  O2 Device: None (Room air)            Physical Exam    GENERAL: Well-developed, well-nourished female sitting up breathing easily in no apparent distress.  Patient is nontoxic in appearance.  EXTREMITIES: There is no cyanosis, clubbing, or edema appreciated. Pulses are 2+ in the left upper extremity there is an area of some erythema measuring approximately 2 cm in diameter to the antecubital area of the left arm with a small puncture wound in the center likely from a insect sting or bite.  Patient has no evidence of any hematoma to the area.  There is no evidence of any fluctuance or induration in the area appreciated.  NEURO: Patient is awake, alert and oriented to time place and person. Motor strength is 5 over 5 in all joints of the left upper extremity.  Patient has no other swelling throughout the left upper extremity.  Patient answering all questions appropriately.        ED Course   Labs Reviewed - No data to display                   MDM      Patient with evidence of a local histamine reaction from an insect sting or bite to the antecubital fossa on the left arm.  There is no evidence of any cellulitic change noted to the left upper extremity.  There is no evidence of any hematoma of the left upper extremity.  Patient has no evidence of any other systemic response.  The patient be treated with oral Benadryl at home instructions to apply ice and/or baking soda to the area at home return to the ER immediately if symptoms worsen or if any other problems arise.  The patient was discharged to home with no further complaints at this time.                                   Medical Decision Making      Disposition and Plan     Clinical  Impression:  1. Insect stings, undetermined intent, initial encounter         Disposition:  Discharge  8/17/2024  8:12 pm    Follow-up:  Ny Russ MD  [address] [place] [id number] [phone]    Follow up in 2 day(s)  or call your md for follow up this week as needed          Medications Prescribed:  Current Discharge Medication List

## 2024-12-16 ENCOUNTER — HOSPITAL ENCOUNTER (EMERGENCY)
Facility: HOSPITAL | Age: 84
Discharge: HOME | End: 2024-12-16
Attending: EMERGENCY MEDICINE
Payer: MEDICARE

## 2024-12-16 VITALS
BODY MASS INDEX: 28.22 KG/M2 | WEIGHT: 140 LBS | HEIGHT: 59 IN | SYSTOLIC BLOOD PRESSURE: 171 MMHG | DIASTOLIC BLOOD PRESSURE: 69 MMHG | RESPIRATION RATE: 16 BRPM | TEMPERATURE: 99 F | OXYGEN SATURATION: 95 % | HEART RATE: 91 BPM

## 2024-12-16 DIAGNOSIS — H10.211 CHEMICAL CONJUNCTIVITIS OF RIGHT EYE: Primary | ICD-10-CM

## 2024-12-16 PROCEDURE — 99283 EMERGENCY DEPT VISIT LOW MDM: CPT

## 2024-12-16 RX ORDER — PREDNISOLONE ACETATE 10 MG/ML
2 SUSPENSION/ DROPS OPHTHALMIC 4 TIMES DAILY
Qty: 1 EACH | Refills: 0 | Status: SHIPPED | OUTPATIENT
Start: 2024-12-16 | End: 2024-12-19

## 2024-12-16 NOTE — ED PROVIDER NOTES
Patient Seen in: [facility] Emergency Department      History     Chief Complaint   Patient presents with    Eye Visual Problem            Stated Complaint: soap into eye    Subjective:     HPI    84-year-old female hypertension, palpitations who reported that she used a liquid Dove soap and her afternoon shower today and escorted in her right eye.  This caused redness and swelling.  She said her eye was closed shut though while in the emergency department, by 5 PM, the swelling was much less. No use of contacts or glasses, including reading glasses.  No itchiness.  She washed her eye out at home.    Objective:   Past Medical History:    Allergic rhinitis    Anal cancer (HCC)    invasive basaloid squamous cell carcinoma, s/p chemo/XRT. [facility]; Dr Barhammand    Aortic arch atherosclerosis (HCC)    CT  - on ASA    Back problem    disc problems    Blood type O+    Bowel obstruction (HCC)    Chronic shoulder pain    Exposure to unspecified radiation    Family history of premature CAD    brother 40s    History of rotator cuff tear    b/l    IFG (impaired fasting glucose)    Lumbar arthropathy    Mixed hyperlipidemia    Muscle weakness    kiran legs but improving after stopping Crestor    Osteoarthrosis, unspecified whether generalized or localized, unspecified site    Personal history of antineoplastic chemotherapy    Pulmonary emphysema (HCC)    CT     Small bowel obstruction (HCC)    Statin intolerance    Visual impairment    Vitamin D insufficiency              Past Surgical History:   Procedure Laterality Date    Appendectomy      Cataract  2017    Cholecystectomy      Colonoscopy      normal    Foot surgery Right 2018    lapinski    Hip fracture surgery Right 2021    Hysterectomy  1985    for fibroids    Implant left  1988    Implant right  's          Oophorectomy Bilateral     Other surgical history      10 breast surg - implants - no cancer  -     Other surgical  history      left leg muscle surgery    Reduction left  1970's    Reduction right  1970's    Thyroidectomy  2019    partial                 Social History     Socioeconomic History    Marital status: Single   Tobacco Use    Smoking status: Former     Current packs/day: 0.00     Average packs/day: 1 pack/day for 56.0 years (56.0 ttl pk-yrs)     Types: Cigarettes     Start date: 1959     Quit date: 2015     Years since quittin.9    Smokeless tobacco: Never    Tobacco comments:     max 2 packs, averaged about a pack a day    Vaping Use    Vaping status: Never Used   Substance and Sexual Activity    Alcohol use: Yes     Alcohol/week: 0.0 standard drinks of alcohol     Comment: holidays / glass    Drug use: No   Other Topics Concern    Caffeine Concern No    Exercise Yes    Seat Belt Yes    Special Diet No    Stress Concern No    Weight Concern No   Social History Narrative    marital status:     occupation: retired     caffeine: 1 cup decaf    blood tranfusion: no    hiv exposure: no    travel: domestic;leved in [place] 4 yrs 20 yrs ago    exercise: gym; walks treadmill    mammo:     dexa: never    pap: yoan bso     colonoscopy:     lab: DR Barhammand    prostate:      testicles:      influenza: 2013    bdt: ?    pneumovax: 2013    zoster: no                              Review of Systems    Positive for stated complaint: soap into eye  Other systems are as noted in HPI.  Constitutional and vital signs reviewed.      All other systems reviewed and negative except as noted above.    Physical Exam     ED Triage Vitals [24 1635]   BP (!) 171/69   Pulse 91   Resp 16   Temp 98.6 °F (37 °C)   Temp src Temporal   SpO2 95 %   O2 Device        Current:BP (!) 171/69   Pulse 91   Temp 98.6 °F (37 °C) (Temporal)   Resp 16   Ht 149.9 cm (4' 11\")   Wt 63.5 kg   LMP  (LMP Unknown)   SpO2 95%   BMI 28.28 kg/m²     General:  Vitals as listed.  No acute distress    HEENT: Sclerae anicteric.  Conjunctivae show palpebral injection and mild upper and lower lid swelling.  Oropharynx clear, mucous membranes moist   Lungs: good air exchange  Neuro: Alert oriented and nonfocal    ED COURSE and MDM     Prescribed prednisolone drops for 3 days.  Can follow-up with ophthalmologist if no better.    I have discussed with the patient the results of testing, differential diagnosis, and treatment plan. They expressed clear understanding of these instructions and agrees to the plan provided.    Disposition and Plan     Clinical Impression:  1. Chemical conjunctivitis of right eye         Disposition:  There is no disposition on file for this visit.  There is no disposition time on file for this visit.    Follow-up:  [facility] [address] [address]  Schedule an appointment as soon as possible for a visit in 3 day(s)  As needed        Medications Prescribed:  Current Discharge Medication List        START taking these medications    Details   prednisoLONE 1 % Ophthalmic Suspension Place 2 drops into the right eye 4 (four) times daily for 3 days.  Qty: 1 each, Refills: 0

## 2024-12-16 NOTE — ED INITIAL ASSESSMENT (HPI)
PT PRESENTS TO ED WITH DOVE SOAP IN RIGHT EYE, STILL HAVING PAIN, OCCURRED AT 1330 THIS AFTERNOON

## 2025-06-10 ENCOUNTER — HOSPITAL ENCOUNTER (OUTPATIENT)
Dept: MRI IMAGING | Facility: HOSPITAL | Age: 85
Discharge: HOME | End: 2025-06-10
Attending: PHYSICAL MEDICINE & REHABILITATION
Payer: MEDICARE

## 2025-06-10 PROCEDURE — 72195 MRI PELVIS W/O DYE: CPT | Performed by: PHYSICAL MEDICINE & REHABILITATION

## 2025-06-27 ENCOUNTER — APPOINTMENT (OUTPATIENT)
Dept: GENERAL RADIOLOGY | Facility: HOSPITAL | Age: 85
End: 2025-06-27
Payer: MEDICARE

## 2025-06-27 ENCOUNTER — HOSPITAL ENCOUNTER (EMERGENCY)
Facility: HOSPITAL | Age: 85
Discharge: HOME | End: 2025-06-27
Attending: EMERGENCY MEDICINE
Payer: MEDICARE

## 2025-06-27 VITALS
HEART RATE: 96 BPM | DIASTOLIC BLOOD PRESSURE: 77 MMHG | BODY MASS INDEX: 28.22 KG/M2 | HEIGHT: 59 IN | SYSTOLIC BLOOD PRESSURE: 133 MMHG | RESPIRATION RATE: 16 BRPM | OXYGEN SATURATION: 94 % | WEIGHT: 140 LBS | TEMPERATURE: 98 F

## 2025-06-27 DIAGNOSIS — S90.112A CONTUSION OF LEFT GREAT TOE WITHOUT DAMAGE TO NAIL, INITIAL ENCOUNTER: Primary | ICD-10-CM

## 2025-06-27 DIAGNOSIS — M19.072 PRIMARY OSTEOARTHRITIS OF LEFT FOOT: ICD-10-CM

## 2025-06-27 PROCEDURE — 99283 EMERGENCY DEPT VISIT LOW MDM: CPT

## 2025-06-27 PROCEDURE — 99284 EMERGENCY DEPT VISIT MOD MDM: CPT

## 2025-06-27 PROCEDURE — 73630 X-RAY EXAM OF FOOT: CPT | Performed by: EMERGENCY MEDICINE

## 2025-06-27 NOTE — ED INITIAL ASSESSMENT (HPI)
Pt arrives to ED for evaluation of an injury to her right great toe, pt denies injury or trauma, pt states all she did different was wear a new pair of Uggs. Pt has noticeable bruising to her right great toe.

## 2025-06-28 NOTE — ED PROVIDER NOTES
Patient Seen in: [facility] Emergency Department       The following individual(s) verbally consented to be recorded using ambient AI listening technology and understand that they can each withdraw their consent to this listening technology at any point by asking the clinician to turn off or pause the recording:    Patient name: Sho Pineda  Additional names:        History  Chief Complaint   Patient presents with    Toe Pain     Stated Complaint: right big toe pain    Subjective:   HPI     Sho Pineda is an 84 year old female who presents with a bruised big toe after wearing new shoes.    She wore a new pair of shoes for approximately three to six hours, which resulted in a significant bruise on her big toe the following morning. The bruise is described as having 'blood underneath the skin' and is accompanied by swelling.    No other symptoms such as pain, numbness, or changes in skin color were reported. She has not taken any medication for the bruise and has not applied any topical treatments.        Objective:     Past Medical History:    Allergic rhinitis    Anal cancer (HCC)    invasive basaloid squamous cell carcinoma, s/p chemo/XRT. [facility]; Dr Barhammand    Aortic arch atherosclerosis    CT 2012 - on ASA    Back problem    disc problems    Blood type O+    Bowel obstruction (HCC)    Chronic shoulder pain    Exposure to unspecified radiation    Family history of premature CAD    brother 40s    History of rotator cuff tear    b/l    IFG (impaired fasting glucose)    Lumbar arthropathy    Mixed hyperlipidemia    Muscle weakness    kiran legs but improving after stopping Crestor    Osteoarthrosis, unspecified whether generalized or localized, unspecified site    Personal history of antineoplastic chemotherapy    Pulmonary emphysema (HCC)    CT 2012    Small bowel obstruction (HCC)    Statin intolerance    Visual impairment    Vitamin D insufficiency              Past Surgical  History:   Procedure Laterality Date    Appendectomy      Cataract  2017    Cholecystectomy  2015    Colonoscopy  2015    normal    Foot surgery Right 2018    lapinski    Hip fracture surgery Right 2021    Hysterectomy  1985    for fibroids    Implant left  1988    Implant right  1970's          Oophorectomy Bilateral     Other surgical history      10 breast surg - implants - no cancer  -     Other surgical history      left leg muscle surgery    Reduction left  's    Reduction right  's    Thyroidectomy  2019    partial                 Social History     Socioeconomic History    Marital status: Single   Tobacco Use    Smoking status: Former     Current packs/day: 0.00     Average packs/day: 1 pack/day for 56.0 years (56.0 ttl pk-yrs)     Types: Cigarettes     Start date: 1959     Quit date: 2015     Years since quitting: 10.4    Smokeless tobacco: Never    Tobacco comments:     max 2 packs, averaged about a pack a day    Vaping Use    Vaping status: Never Used   Substance and Sexual Activity    Alcohol use: Yes     Alcohol/week: 0.0 standard drinks of alcohol     Comment: holidays /2 glass    Drug use: No   Other Topics Concern    Caffeine Concern No    Exercise Yes    Seat Belt Yes    Special Diet No    Stress Concern No    Weight Concern No   Social History Narrative    marital status:     occupation: retired     caffeine: 1 cup decaf    blood tranfusion: no    hiv exposure: no    travel: domestic;leved in [place] 4 yrs 20 yrs ago    exercise: gym; walks treadmill    mammo:     dexa: never    pap: yoan bso 2009    colonoscopy:     lab: DR Barhammand    prostate:      testicles:      influenza: 2013    bdt: ?    pneumovax: 2013    zoster: no                                                Physical Exam    ED Triage Vitals [25 1506]   /77   Pulse 96   Resp 16   Temp 98.1 °F (36.7 °C)   Temp src Temporal   SpO2 94 %   O2 Device  None (Room air)       Current Vitals:   Vital Signs  BP: 133/77  Pulse: 96  Resp: 16  Temp: 98.1 °F (36.7 °C)  Temp src: Temporal    Oxygen Therapy  SpO2: 94 %  O2 Device: None (Room air)            Physical Exam  General: Well-appearing patient of stated age resting comfortably  HEENT: Normocephalic atraumatic.  Nonicteric sclera.  Moist mucous membranes  Lungs: No tachypnea  Cardiac: No tachycardia  Skin: No rashes, pallor  Neuro: No focal deficits.  Extremities: Areas of ecchymosis to the little toe of the right foot.  No deformity.  Strong pulses.  Neurovascular intact.      ED [place] foot  Labs Reviewed - No data to display       Right foot x-rays: I personally reviewed the films and my independent interpertaion showed no acute fractures.  Evidence of osteoarthritis.  Official report reviewed.  Mild degenerative changes.                  MDM     Toe pain.  Patient relates to uncomfortable shoes.  Has a contusion on exam.  Neurovascular intact.  X-rays reviewed rule out fracture which were negative.  Mild arthritis.  Supportive care.  Comfortable shoes.  Follow-up with primary care.        MDM    Disposition and Plan     Clinical Impression:  1. Contusion of left great toe without damage to nail, initial encounter    2. Primary osteoarthritis of left foot         Disposition:  Discharge  6/27/2025  4:19 pm    Follow-up:  your doctor    Follow up in 1 week(s)            Medications Prescribed:  Discharge Medication List as of 6/27/2025  4:23 PM                Supplementary Documentation:

## (undated) DIAGNOSIS — Z01.818 PREOP EXAMINATION: Primary | ICD-10-CM

## (undated) DIAGNOSIS — Z11.59 ENCOUNTER FOR SCREENING FOR OTHER VIRAL DISEASES: ICD-10-CM

## (undated) DEVICE — STERILE LATEX POWDER-FREE SURGICAL GLOVESWITH NITRILE COATING: Brand: PROTEXIS

## (undated) DEVICE — TETRA-FLEX CF WOVEN LATEX FREE ELASTIC BANDAGE 6" X 5.5 YD: Brand: TETRA-FLEX™CF

## (undated) DEVICE — BIT DRL 160MM 4MM SS QC

## (undated) DEVICE — SOL  .9 1000ML BTL

## (undated) DEVICE — STERILE LATEX POWDER-FREE SURGICAL GLOVES WITH HYDROGEL COATING, SMOOTH FINISH, STRAIGHT FINGER: Brand: PROTEXIS

## (undated) DEVICE — Device

## (undated) DEVICE — INTENDED FOR TISSUE SEPARATION, AND OTHER PROCEDURES THAT REQUIRE A SHARP SURGICAL BLADE TO PUNCTURE OR CUT.: Brand: BARD-PARKER ® STAINLESS STEEL BLADES

## (undated) DEVICE — GOWN,SIRUS,FABRIC-REINFORCED,LARGE: Brand: MEDLINE

## (undated) DEVICE — ABSORBABLE HEMOSTAT (OXIDIZED REGENERATED CELLULOSE, U.S.P.): Brand: SURGICEL

## (undated) DEVICE — STERILE TETRA-FLEX CF, ELASTIC BANDAGE, 4" X 5.5YD: Brand: TETRA-FLEX™CF

## (undated) DEVICE — 3M™ STERI-STRIP™ REINFORCED ADHESIVE SKIN CLOSURES, R1547, 1/2 IN X 4 IN (12 MM X 100 MM), 6 STRIPS/ENVELOPE: Brand: 3M™ STERI-STRIP™

## (undated) DEVICE — SUTURE SILK 2-0

## (undated) DEVICE — DRAPE C-ARM UNIVERSAL

## (undated) DEVICE — STERILE POLYISOPRENE POWDER-FREE SURGICAL GLOVES WITH EMOLLIENT COATING: Brand: PROTEXIS

## (undated) DEVICE — LIGACLIP EXTRA LIGATING CLIP CARTRIDGES: 6 TITANIUM CLIPS/ CARTRIDGE (SMALL): Brand: LIGACLIP

## (undated) DEVICE — GUIDEWIRE SYNT 2.8X300 292.68

## (undated) DEVICE — SUTURE VICRYL 3-0 SH

## (undated) DEVICE — TOWEL OR BLU 16X26 STRL

## (undated) DEVICE — PADDING 4YDX6IN CTTN STRL WBRL

## (undated) DEVICE — CAUTERY NEEDLE 2IN INS E1465

## (undated) DEVICE — SPONGE: SPECIALTY PEANUT XR 100/CS: Brand: MEDICAL ACTION INDUSTRIES

## (undated) DEVICE — WEBRIL COTTON UNDERCAST PADDING: Brand: WEBRIL

## (undated) DEVICE — GAUZE SPONGES,12 PLY: Brand: CURITY

## (undated) DEVICE — SUTURE MONOCRYL 3-0 Y936H

## (undated) DEVICE — SUTURE TIGERTAPE 2 AR-7237-7T

## (undated) DEVICE — SCD SLEEVE KNEE HI BLEND

## (undated) DEVICE — HIP PINNING CDS: Brand: MEDLINE INDUSTRIES, INC.

## (undated) DEVICE — 3M(TM) MICROPORE TAPE DISPENSER 1535-2: Brand: 3M™ MICROPORE™

## (undated) DEVICE — SUTURE VICRYL 0 CP-1

## (undated) DEVICE — GAMMEX® NON-LATEX PI TEXTURED SIZE 6.5, STERILE POLYISOPRENE POWDER-FREE SURGICAL GLOVE: Brand: GAMMEX

## (undated) DEVICE — PRECISION (9.0 X 0.51 X 31.0MM)

## (undated) DEVICE — STRYKER HARMONIC 23CM REPRCSED

## (undated) DEVICE — LIGHT HANDLE

## (undated) DEVICE — GAMMEX® NON-LATEX SIZE 8, STERILE NEOPRENE POWDER-FREE SURGICAL GLOVE: Brand: GAMMEX

## (undated) DEVICE — LOWER EXTREMITY: Brand: MEDLINE INDUSTRIES, INC.

## (undated) DEVICE — SUTURE SILK 3-0

## (undated) DEVICE — PROBE 8225101 5PK STD PRASS FL TIP ROHS

## (undated) DEVICE — GAUZE SPONGES,USP TYPE VII GAUZE, 12 PLY: Brand: CURITY

## (undated) DEVICE — KIT BIO TEND AR-1676DS

## (undated) DEVICE — KENDALL SCD EXPRESS SLEEVES, KNEE LENGTH, MEDIUM: Brand: KENDALL SCD

## (undated) DEVICE — CHLORAPREP 26ML APPLICATOR

## (undated) DEVICE — SPLINT PLASTER 5

## (undated) DEVICE — C-ARMOR C-ARM EQUIPMENT COVERS CLEAR STERILE UNIVERSAL FIT 12 PER CASE: Brand: C-ARMOR

## (undated) DEVICE — BIT DRL 150MM 2.9MM SS QC CNN

## (undated) DEVICE — CHLORAPREP ORANGE TINT 10.5ML

## (undated) DEVICE — REM POLYHESIVE ADULT PATIENT RETURN ELECTRODE: Brand: VALLEYLAB

## (undated) DEVICE — FIBERWIRE 2.0 AR-7220

## (undated) DEVICE — SCREW CANN PT 6.5 16X85
Type: IMPLANTABLE DEVICE | Site: HIP | Status: NON-FUNCTIONAL
Removed: 2021-08-23

## (undated) DEVICE — SUTURE VICRYL 2-0 FSL

## (undated) DEVICE — 3M™ STERI-DRAPE™ U-DRAPE 1015: Brand: STERI-DRAPE™

## (undated) DEVICE — NON-ADHERENT STRIPS,OIL EMULSION: Brand: CURITY

## (undated) DEVICE — ZIMMER® STERILE DISPOSABLE TOURNIQUET CUFF WITH PLC, DUAL PORT, SINGLE BLADDER, 30 IN. (76 CM)

## (undated) DEVICE — 3M™ MICROFOAM™ TAPE 1528-4: Brand: 3M™ MICROFOAM™

## (undated) DEVICE — SUTURE ETHILON 3-0 669H

## (undated) DEVICE — SUTURE MONOCRYL 4-0 PS-2

## (undated) DEVICE — DRILL BIT SYNT 5.0 310.63

## (undated) DEVICE — DRAPE SHEET LG

## (undated) DEVICE — MINI LAP PACK-LF: Brand: MEDLINE INDUSTRIES, INC.

## (undated) DEVICE — 1010 S-DRAPE TOWEL DRAPE 10/BX: Brand: STERI-DRAPE™

## (undated) NOTE — ED AVS SNAPSHOT
[facility] Emergency Department    [place]  [address] [place] [address]    Phone:  [phone]    Fax:  [address] [place] [address]   MRN: [id number]    Department:  [facility] Emergency Department   Date of Visit:  5/29/2 Aug 21, 2017  1:30 PM   FOLLOW UP with MD Craig Ortiz ([place] [facility] [address])    [address] [place] [address]            Oct 03, 2017  1:00 PM   PHYSICAL-ESTABL Expect to receive an electronic request (by e-mail or text) to complete a self-assessment the day after your visit. You may also receive a call from our patient liason soon after your visit.  Also, some patients receive a detailed feedback survey mailed [facility] [address]  ([address]) [address] [facility] [phone] [facility] [facility]. ([address] [place] [address]) [phone]   [address]  [address] ( PATIENT STATED HISTORY: (As transcribed by Technologist)  Patinet was pulled by dog on leash, abrasions/pain posterior aspect of right wrist.          FINDINGS:          No acute carpal fracture or dislocation.  Degenerative change at the scaphotrapezium ar

## (undated) NOTE — ED AVS SNAPSHOT
[facility] Emergency Department    [place]  [address] [address]    Phone:  [phone]    Fax:  [facility]   MRN: [id number]    Department:  [facility] Emergency Department   Date of Visit:  5/29/2 IF THERE IS ANY CHANGE OR WORSENING OF YOUR CONDITION, CALL YOUR PRIMARY CARE PHYSICIAN AT ONCE OR RETURN IMMEDIATELY TO THE EMERGENCY DEPARTMENT.     If you have been prescribed any medication(s), please fill your prescription right away and begin taking t

## (undated) NOTE — LETTER
12/30/21    H&P [address]  8/13/1940    Surgeon: Dr. 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  Please fax a copy to:    [facility]-Fax [phone]

## (undated) NOTE — ED AVS SNAPSHOT
[facility] Emergency Department    [place]  [address] [address]    Phone:  [phone]    Fax:  [facility]   MRN: [id number]    Department:  [facility] Emergency Department   Date of Visit:  5/26/2 IF THERE IS ANY CHANGE OR WORSENING OF YOUR CONDITION, CALL YOUR PRIMARY CARE PHYSICIAN AT ONCE OR RETURN IMMEDIATELY TO THE EMERGENCY DEPARTMENT.     If you have been prescribed any medication(s), please fill your prescription right away and begin taking t

## (undated) NOTE — LETTER
Ashely Cornejo [address] E  [place], [address] [place] [address]    Consent for Operation  Date: __________________                                Time: _______________    1.  I authorize the performance upon Inessa Romano the following operation:  Procedure(s revealed by the pictures or by descriptive texts accompanying them. If the procedure has been videotaped, the surgeon will obtain the original videotape. The hospital will not be responsible for storage or maintenance of this tape.   8. For the purpose of a THAT MY DOCTOR PROVIDED ME WITH THE ABOVE EXPLANATIONS, THAT ALL BLANKS OR STATEMENTS REQUIRING INSERTION OR COMPLETION WERE FILLED IN.     Signature of Patient:   ___________________________    When the patient is a minor or mentally incompetent to give co iii. All of the medicines I take (including prescriptions, herbal supplements, and pills I can buy without a prescription (including street drugs/illegal medications).  Failure to inform my anesthesiologist about these medicines may increase my risk of anes _____________________________________________________________________________  Anesthesiologist Signature     Date   Time  I have discussed the procedure and information above with the patient (or patient’s representative) and answered their questions.  The

## (undated) NOTE — IP AVS SNAPSHOT
Patient Demographics     Address  [place] [address] Phone  [phone] [facility])  [phone] (Mobile) *Preferred* E-mail Address  [email or web address]      Emergency Contact(s)     Name Relation Home Work Davide Mayen weight on your leg. You will need to use a walker, crutches, or wheelchair to get around. • Partial weight bearing (PWB) ? you may only put about half of your weight on your leg. You will need a walker or crutches to get around.   • Weight bearing as devika after surgery). Patient should be standing or lying flat so dressing goes on smoothly.   (This dressing needed for hip patients because of location of incision-don’t want contamination from bathroom use)  FOR DRY STERILE GAUZE DRESSINGS:   • Change dressing monitoring. • You will bleed easier and bruise easier while on these medications. • Usually you will be on a blood thinner for about 4-5 weeks after surgery. • Contact your physician if you have signs of bruising, nose bleeds or blood in your urine. Constipation is common with the use of narcotics. • Eat fiber rich foods and drink plenty of fluids, at least 4-6 glasses of water a day, unless restricted.   • To prevent constipation, use stool softeners such as Colace and laxatives such as Miralax, Livia notice any of the following signs  • Separation of incision line. • Increased redness, swelling, or warmth of skin around incision. • Increased or foul smelling drainage from incision  • Red streaks on skin near incision. • Temperature >100.4F.   • Incre of Motor Vehicles. Some [facility] offices provide the same service. ([facility] & [facility] and [facility] have this service; if you live in another [facility], you may check with them as well).  You need space to open car doors to position yourself properly with known [employer]  Start taking on: August 26, 2021  Next dose due: Tomorrow morning 8:00 am      Take 1 tablet (10 mg total) by mouth daily with food.    Vesna Duong MD               Where to Get Your Medications      These medications were sent to 1. 11 0.89 - 1.11 — [facility])            CBC With Differential With Platelet [[id number]] (Abnormal)  Resulted: 08/25/21 0543, Result status: Final result   Ordering provider: Vega Beth MD  08/24/21 2300 Resulting lab: [facility] ( MD ISIDORO (Physician)    Related Notes: Original Note by Kaur Ruth MD (Physician) filed at 8/22/2021  8:37 PM         [facility] Hospitalist H&P       CC:[HA.1] Patient presents with:  Fall  Leg or Foot Injury[HA. 2]       PCP:[ROSE.1] Kaleb Arredondo MD[ROSE. 2] Personal history of antineoplastic chemotherapy 2007   • PONV (postoperative nausea and vomiting)     many yrs ago   • Pulmonary emphysema (HCC)[HA. 2]         PSH[HA.1]  Past Surgical History:   Procedure Laterality Date   • APPENDECTOMY     • CATARACT  11 a pack a day     Alcohol use: Yes      Alcohol/week: 0.0 standard drinks      Comment: rarely[HA. 2]       Fam Hx[HA. 1]  Family History   Problem Relation Age of Onset   • Hypertension Father    • Cancer Father    • Lipids Mother    • Other (Other) Mother rhonchi or wheezes  CV:  nL S1/S2 regular rate and rhythm  Abd: soft, NT/ND, no hepatomegaly, +BS  MSK: moving all extremities, no edema-) tenderness  Neuro: no focal deficits  Skin: no rashes/lesions  Psych: normal mood/affect          Diagnostic Data: transcribed by Technologist)  Patient states she fell on her right side today. States pain in right hip. History of pelvic fracture 9/2020. FINDINGS:  Mildly impacted fracture of the right femoral neck noted.   Chronic incompletely healed fracture deform than 70 minutes spent in d/w pt/family, coordination of care, and d/w staff. R [address]   Internal Medicine  [facility] Hospitalist[HA. 1]            Electronically signed by Jomar Chaparro MD on 8/23/2021  8:43 AM   Attribution Key    HA. 1 - Abraham Min fevers chills nausea vomiting abdominal pain lightheadedness dizziness review of system otherwise complete negative  PMH[HA.1]  Past Medical History:   Diagnosis Date   • Anal cancer ([facility] [address] 2007    invasive basaloid squamous cell carcinoma, s/p chemo/XRT.  G Oral Chew Tab, Chew 3 tablets by mouth daily. , Disp: , Rfl:   hydrocortisone 2.5 % External Cream, Apply 1 Application topically 2 (two) times daily as needed. , Disp: , Rfl:   Multiple Vitamins-Minerals (MULTI-VITAMIN/MINERALS) Oral Tab, Take 1 tablet by m 132 lb (59.9 kg)   LMP  (LMP Unknown)   SpO2 96%   BMI 26.66 kg/m²     BP Readings from Last 3 Encounters:  08/22/21 : (!) 152/100  06/14/21 : 134/74  05/07/21 : 112/70    Wt Readings from Last 3 Encounters:  08/22/21 : 132 lb (59.9 kg)  05/07/21 : 129 lb Unremarkable soft tissues. CONCLUSION:  No evidence of acute displaced fracture or dislocation in the right knee.   Dictated by (CST): Brant Liu MD on 8/22/2021 at 6:04 PM     Finalized by (CST): Brant Liu MD on 8/22/2021 at 6:05 PM -X-ray hip reviewed  -DVT prophylaxis  -PT OT post Ortho evaluation  -Pain control    #Hyperlipidemia  -On statin secondary to myalgias    # history of pelvic fracture and L5 transverse process fracture    #History of anal cancer status post chemoradiation Patient states that she does use a walker occasionally for ambulation. No other complaints at this time. Patient is exam shows her to have some mild pain with passive range of motion of her right hip.   Mild to moderate pain with flexion past 60 degrees Condition: good    Exam: (see progress notes for full details)  No acute distress, alert and oriented    Hospital Course:     Patient is a 81-year-old female with significant past medical history of anal cancer status post chemo and radiation in remission, START taking these medications    PEG 3350 17 g Oral Powd Pack  Take 17 g by mouth daily as needed. bisacodyl 10 MG Rectal Suppos  Place 1 suppository (10 mg total) rectally daily as needed.       CONTINUE these medications which have NOT CHANGED    aspi 8/23/21[AR.2]    ASSESSMENT     Pt seen this AM for bed mobility, transfers, gait training and BLE strengthening.   Pt with improved ambulation distance this session, when compared to PT Eval.  At this time, Pt. presents with decreased balance, impaired str PAIN ASSESSMENT[AR.1]   Ratin  Location: RLE  Management Techniques: Activity promotion; Body mechanics;Repositioning; Other (Comment) (maintain 25% WB on RLE, pain meds; ice)[AR.2]    BALANCE[AR.1] Extremity Alternating marching  Ankle pumps  Knee extension     Upper Extremity Elbow flex/ext     Position Sitting     Repetitions   10   Sets   1[AR.1]     Patient End of Session: Up in chair;Needs met;Call light within reach;RN aware of session/findings fracture of right hip, initial encounter [facility])  Active Problems:    Closed fracture of right hip (HCC)[CK.2]      Past Medical History[CK.1]  Past Medical History:   Diagnosis Date   • Anal cancer ([facility] [address] 2007    invasive basaloid squamous cell carcinoma, s/ can't use it. \" c/o pain in R hip with mobility. Pt is refusing pain medications. No c/o dizziness, nausea. Pt repeatedly telling me how she did last Sept, and what her function has been recently. \"I am not having HHPT again.  They did nothing but talk to nursing, but she states that she was also told that she would have a thing brought to the bed. Pt does remember I asked her to not get back into bed for 90 minutes. Pt stating she was asking what she is allowed to do because no one has told her.  Pt very fr of reinforcement)  Stoop/Curb Assistance: Not tested  Comment : n/a[CK. 2]    Bed Mobility  Rolling to the Right = NT  Rolling to the Left = NT  Supine to Sit = supervision  Sit to supine = NT    Transfers  Sit to stand = min assist and lots of cues for 25% within reach;RN aware of session/findings; All patient questions and concerns addressed; Alarm set (refusing SCD's)[CK. 2]    ASSESSMENT     Patient is a[CK.3 80year old[CK.2] female admitted on 8/22/2021 for presenting problem above.    Pertinent comorbidit 25% PWB on RLE Sit to/from Stand at assistance level: supervison     Goal #3    Patient is able to ambulate w/PWB 25% on RLE for 3 feet with assistive device at assistance level: modified independent    Goal #4   Patient verbalizes and/or demonstrates 25% Closed fracture of right hip, initial encounter [facility])  Active Problems:    Closed fracture of right hip (HCC)[CK.2]      Past Medical History[CK.1]  Past Medical History:   Diagnosis Date   • Anal cancer ([facility] [address] 2007    invasive basaloid squamous cell carcin Static Standing: Fair -  Dynamic Standing: Poor +    ADDITIONAL TESTS                                 ACTIVITY TOLERANCE  Room air    AM-PAC '6-Clicks' INPATIENT SHORT FORM - BASIC MOBILITY  How much difficulty does the patient currently have. ..  -   Turn VC's provided for expectations, encouragement, reassurance, t/f tech's, pain control tech's, gait pattern. Exercise/Education Provided:  Functional activity tolerated  Gait training  Transfer training  Eval completed.   Educ: activity recommendations, ro that patients with this level of impairment may benefit from HHPT with 3 days initially of 24 hour care/supervision, transport chair and b/s commode.      Based on this evaluation, patient's clinical presentation is stable and overall the evaluation complex Service: 8/23/2021  8:55 AM Status: Signed    : Adrienne Ruiz, PT (Physical Therapist)       Order rec'd and chart reviewed. Pt scheduled for ORIF later today. Will sign off and await new orders post op. [CK.1]     Attribution Key    CK. 1 - 10/26/2017   • Muscle weakness     kiran legs but improving after stopping Crestor   • Osteoarthrosis, unspecified whether generalized or localized, unspecified site    • Personal history of antineoplastic chemotherapy 2007   • PONV (postoperative nausea and attends with cues to redirect  Memory:  decreased recall of precautions and decreased short term memory  Following Commands:  follows one step commands with repetition and follows multistep commands with repetition  Safety Judgement:  decreased awareness o to L side of bed (per patient request) with min assist and increased time; standing x2 demos via RW and min assist with cues for PWB, heavy use of BUE required to maintain WB; pivot transfer to bedside chair on R side via min assist with safety cues and cu benefit from JAMES at discharge. The patient is below her baseline and would benefit from continued skilled OT to address the activity limitations identified by the AM-PAC \"6 clicks\". Subacute rehab is recommended at discharge.  After this period of rehabil Goals:[BW.1]  Patient will maintain PWB25% with min cueing[BW.2]  Patient will participate in cognitive screening[BW.4]       Attribution [place]    BW.1 - Norma Walters OT on 8/24/2021 12:33 PM  BW.2 - Norma Walters OT on 8/24/2021  1:41 PM  BW.3 Disciplines Outcome Interventions   Patient/Family Long Term Goal     Interdisciplinary Progressing    Description: Patient's Long Term Goal: have the hip and pelvic fracture fixed and be able to walk    Interventions:  - surgery  PT/OT  - See additional C

## (undated) NOTE — ED AVS SNAPSHOT
[facility] Emergency Department    [place]  [address] [place] [address]    Phone:  [phone]    Fax:  [facility]   MRN: [id number]    Department:  [facility] Emergency Department   Date of Visit:  5/26/2 PULMONARY REHAB PHASE II with [facility] 4[facility] Cardiop[facility] [place])    [place]  [facility]  [address] [phone]            Aug 21, 2017  1:30 PM   FOLLOW UP with Grant Desir Expect to receive an electronic request (by e-mail or text) to complete a self-assessment the day after your visit. You may also receive a call from our patient liason soon after your visit.  Also, some patients receive a detailed feedback survey mailed to [place] [address]  ([address]) [address] [facility] [phone] [address] [facility] [address]. ([address] [place] [address]) [phone] [address] [address] [address] ( PATIENT STATED HISTORY: (As transcribed by Technologist)  Patient states she twisted her left knee. Now with complaints of pain in knee medial to patella.                   Sara

## (undated) NOTE — IP AVS SNAPSHOT
[address]            (For Outpatient Use Only) Initial Admit Date: 8/22/2021   Inpt/Obs Admit Date: Inpt: 8/22/21 / Obs: N/A   Discharge Date:    Deysi Copier:  [de-identified]   MRN: [de-identified]   CSN: [id number]   CEID: [id number]   TERTIARY INSURANCE   Payor:  Plan:    Group Number:  Insurance Type:    Subscriber Name:  Subscriber :    Subscriber ID:  Pt Rel to Subscriber:    Hospital Account Financial Class: Medicare    2021

## (undated) NOTE — LETTER
Ashely Cornejo [address]  [address] [facility] [address], [address] [facility]  Authorization for Surgical Operation and Procedure     Date:___________                                                                                                         Time:__________ fever and allergic reactions, hemolytic reactions, transmission of diseases such as Hepatitis, AIDS and Cytomegalovirus (CMV) and fluid overload. In the event that I wish to have an autologous transfusion of my own blood, or a directed donor transfusion. applicable recovery period ends for purposes of reinstating the DNAR order.   10. Patients having a sterilization procedure: I understand that if the procedure is successful the results will be permanent and it will therefore be impossible for me to insemin medicine and do additional procedures as necessary. Some examples are: Starting or using an “IV” to give me medicine, fluids or blood during my procedure, and having a breathing tube placed to help me breathe when I’m asleep (intubation).  In the event that but potential complications include headache, bleeding, infection, seizure, irregular heart rhythms, and nerve injury.     I can change my mind about having anesthesia services at any time before I get the medicine.    ______________________________________

## (undated) NOTE — LETTER
August 21, 2018     Tamara [address]      Dear Jennifer Espinoza: The following are the results of your recent tests ordered by [employer]. Please review the list of test results.   Your result is the numbe

## (undated) NOTE — LETTER
03/10/22   H&P [address]  8/13/1940    Surgeon: Dr. 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 Surgical Procedure: Right total hip arthroplasty with hardware removal.    Date of Surgery: 3/31/2022      In order to proceed with surgery, a physical within 30 days of the procedure is required for medical clearance.  Please fax a copy to:    [facility] [phone]  [facility]-Fax [phone]